# Patient Record
Sex: FEMALE | Race: BLACK OR AFRICAN AMERICAN | NOT HISPANIC OR LATINO | Employment: UNEMPLOYED | URBAN - METROPOLITAN AREA
[De-identification: names, ages, dates, MRNs, and addresses within clinical notes are randomized per-mention and may not be internally consistent; named-entity substitution may affect disease eponyms.]

---

## 2017-08-31 ENCOUNTER — NON-PROVIDER VISIT (OUTPATIENT)
Dept: OBGYN | Facility: CLINIC | Age: 32
End: 2017-08-31

## 2017-08-31 DIAGNOSIS — Z32.01 POSITIVE URINE PREGNANCY TEST: ICD-10-CM

## 2017-08-31 LAB
INT CON NEG: NEGATIVE
INT CON POS: POSITIVE
POC URINE PREGNANCY TEST: POSITIVE

## 2017-08-31 PROCEDURE — 81025 URINE PREGNANCY TEST: CPT | Performed by: OBSTETRICS & GYNECOLOGY

## 2017-10-11 ENCOUNTER — INITIAL PRENATAL (OUTPATIENT)
Dept: OBGYN | Facility: CLINIC | Age: 32
End: 2017-10-11
Payer: MEDICAID

## 2017-10-11 VITALS
HEIGHT: 61 IN | WEIGHT: 159.3 LBS | DIASTOLIC BLOOD PRESSURE: 60 MMHG | SYSTOLIC BLOOD PRESSURE: 112 MMHG | BODY MASS INDEX: 30.08 KG/M2

## 2017-10-11 DIAGNOSIS — N93.8 DUB (DYSFUNCTIONAL UTERINE BLEEDING): ICD-10-CM

## 2017-10-11 PROCEDURE — 99203 OFFICE O/P NEW LOW 30 MIN: CPT | Mod: 25 | Performed by: OBSTETRICS & GYNECOLOGY

## 2017-10-11 PROCEDURE — 76830 TRANSVAGINAL US NON-OB: CPT | Performed by: OBSTETRICS & GYNECOLOGY
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
## 2017-11-14 ENCOUNTER — HOSPITAL ENCOUNTER (OUTPATIENT)
Dept: LAB | Facility: MEDICAL CENTER | Age: 32
End: 2017-11-14
Attending: NURSE PRACTITIONER
Payer: MEDICAID

## 2017-11-14 ENCOUNTER — HOSPITAL ENCOUNTER (OUTPATIENT)
Facility: MEDICAL CENTER | Age: 32
End: 2017-11-14
Attending: NURSE PRACTITIONER
Payer: MEDICAID

## 2017-11-14 ENCOUNTER — INITIAL PRENATAL (OUTPATIENT)
Dept: OBGYN | Facility: CLINIC | Age: 32
End: 2017-11-14
Payer: MEDICAID

## 2017-11-14 VITALS — DIASTOLIC BLOOD PRESSURE: 60 MMHG | WEIGHT: 163 LBS | SYSTOLIC BLOOD PRESSURE: 90 MMHG | BODY MASS INDEX: 30.8 KG/M2

## 2017-11-14 DIAGNOSIS — F15.91 HISTORY OF METHAMPHETAMINE USE: ICD-10-CM

## 2017-11-14 DIAGNOSIS — Z34.82 PRENATAL CARE, SUBSEQUENT PREGNANCY IN SECOND TRIMESTER: ICD-10-CM

## 2017-11-14 DIAGNOSIS — Z34.82 PRENATAL CARE, SUBSEQUENT PREGNANCY IN SECOND TRIMESTER: Primary | ICD-10-CM

## 2017-11-14 DIAGNOSIS — Z98.891 HISTORY OF 2 CESAREAN SECTIONS: ICD-10-CM

## 2017-11-14 LAB
ABO GROUP BLD: NORMAL
APPEARANCE UR: CLEAR
APPEARANCE UR: NORMAL
BASOPHILS # BLD AUTO: 0.5 % (ref 0–1.8)
BASOPHILS # BLD: 0.04 K/UL (ref 0–0.12)
BILIRUB UR QL STRIP.AUTO: NEGATIVE
BILIRUB UR STRIP-MCNC: NORMAL MG/DL
BLD GP AB SCN SERPL QL: NORMAL
COLOR UR AUTO: NORMAL
COLOR UR: YELLOW
CULTURE IF INDICATED INDCX: NO UA CULTURE
EOSINOPHIL # BLD AUTO: 0.07 K/UL (ref 0–0.51)
EOSINOPHIL NFR BLD: 0.9 % (ref 0–6.9)
ERYTHROCYTE [DISTWIDTH] IN BLOOD BY AUTOMATED COUNT: 46.5 FL (ref 35.9–50)
GLUCOSE UR STRIP.AUTO-MCNC: NEGATIVE MG/DL
GLUCOSE UR STRIP.AUTO-MCNC: NORMAL MG/DL
HBV SURFACE AG SER QL: NEGATIVE
HCT VFR BLD AUTO: 38.5 % (ref 37–47)
HGB BLD-MCNC: 12.7 G/DL (ref 12–16)
HIV 1+2 AB+HIV1 P24 AG SERPL QL IA: NON REACTIVE
IMM GRANULOCYTES # BLD AUTO: 0.06 K/UL (ref 0–0.11)
IMM GRANULOCYTES NFR BLD AUTO: 0.7 % (ref 0–0.9)
KETONES UR STRIP.AUTO-MCNC: NEGATIVE MG/DL
KETONES UR STRIP.AUTO-MCNC: NORMAL MG/DL
LEUKOCYTE ESTERASE UR QL STRIP.AUTO: NEGATIVE
LEUKOCYTE ESTERASE UR QL STRIP.AUTO: NORMAL
LYMPHOCYTES # BLD AUTO: 2.56 K/UL (ref 1–4.8)
LYMPHOCYTES NFR BLD: 31.7 % (ref 22–41)
MCH RBC QN AUTO: 31 PG (ref 27–33)
MCHC RBC AUTO-ENTMCNC: 33 G/DL (ref 33.6–35)
MCV RBC AUTO: 93.9 FL (ref 81.4–97.8)
MICRO URNS: ABNORMAL
MONOCYTES # BLD AUTO: 0.38 K/UL (ref 0–0.85)
MONOCYTES NFR BLD AUTO: 4.7 % (ref 0–13.4)
NEUTROPHILS # BLD AUTO: 4.97 K/UL (ref 2–7.15)
NEUTROPHILS NFR BLD: 61.5 % (ref 44–72)
NITRITE UR QL STRIP.AUTO: NEGATIVE
NITRITE UR QL STRIP.AUTO: NORMAL
NRBC # BLD AUTO: 0 K/UL
NRBC BLD AUTO-RTO: 0 /100 WBC
PH UR STRIP.AUTO: 5 [PH] (ref 5–8)
PH UR STRIP.AUTO: 6.5 [PH]
PLATELET # BLD AUTO: 209 K/UL (ref 164–446)
PMV BLD AUTO: 11.8 FL (ref 9–12.9)
PROT UR QL STRIP: NEGATIVE MG/DL
PROT UR QL STRIP: NORMAL MG/DL
RBC # BLD AUTO: 4.1 M/UL (ref 4.2–5.4)
RBC UR QL AUTO: NEGATIVE
RBC UR QL AUTO: NORMAL
RH BLD: NORMAL
RUBV AB SER QL: 46.5 IU/ML
SP GR UR STRIP.AUTO: 1.01
SP GR UR STRIP.AUTO: 1.02
TREPONEMA PALLIDUM IGG+IGM AB [PRESENCE] IN SERUM OR PLASMA BY IMMUNOASSAY: NON REACTIVE
UROBILINOGEN UR STRIP-MCNC: NORMAL MG/DL
UROBILINOGEN UR STRIP.AUTO-MCNC: 0.2 MG/DL
WBC # BLD AUTO: 8.1 K/UL (ref 4.8–10.8)

## 2017-11-14 PROCEDURE — 86901 BLOOD TYPING SEROLOGIC RH(D): CPT

## 2017-11-14 PROCEDURE — 86900 BLOOD TYPING SEROLOGIC ABO: CPT

## 2017-11-14 PROCEDURE — 87591 N.GONORRHOEAE DNA AMP PROB: CPT

## 2017-11-14 PROCEDURE — 86780 TREPONEMA PALLIDUM: CPT

## 2017-11-14 PROCEDURE — 59401 PR NEW OB VISIT: CPT | Performed by: NURSE PRACTITIONER

## 2017-11-14 PROCEDURE — 36415 COLL VENOUS BLD VENIPUNCTURE: CPT

## 2017-11-14 PROCEDURE — 85025 COMPLETE CBC W/AUTO DIFF WBC: CPT

## 2017-11-14 PROCEDURE — 86762 RUBELLA ANTIBODY: CPT

## 2017-11-14 PROCEDURE — 87389 HIV-1 AG W/HIV-1&-2 AB AG IA: CPT

## 2017-11-14 PROCEDURE — 87340 HEPATITIS B SURFACE AG IA: CPT

## 2017-11-14 PROCEDURE — 81511 FTL CGEN ABNOR FOUR ANAL: CPT

## 2017-11-14 PROCEDURE — 81002 URINALYSIS NONAUTO W/O SCOPE: CPT | Performed by: NURSE PRACTITIONER

## 2017-11-14 PROCEDURE — 86850 RBC ANTIBODY SCREEN: CPT

## 2017-11-14 PROCEDURE — 81003 URINALYSIS AUTO W/O SCOPE: CPT

## 2017-11-14 PROCEDURE — 88175 CYTOPATH C/V AUTO FLUID REDO: CPT

## 2017-11-14 PROCEDURE — 87491 CHLMYD TRACH DNA AMP PROBE: CPT

## 2017-11-14 ASSESSMENT — ENCOUNTER SYMPTOMS
GASTROINTESTINAL NEGATIVE: 1
TREMORS: 0
CARDIOVASCULAR NEGATIVE: 1
SENSORY CHANGE: 0
DIZZINESS: 1
PSYCHIATRIC NEGATIVE: 1
FOCAL WEAKNESS: 0
SPEECH CHANGE: 0
MUSCULOSKELETAL NEGATIVE: 1
CONSTITUTIONAL NEGATIVE: 1
SEIZURES: 0
RESPIRATORY NEGATIVE: 1
EYES NEGATIVE: 1
LOSS OF CONSCIOUSNESS: 0
TINGLING: 0

## 2017-11-14 ASSESSMENT — PATIENT HEALTH QUESTIONNAIRE - PHQ9: CLINICAL INTERPRETATION OF PHQ2 SCORE: 0

## 2017-11-14 NOTE — LETTER
Cystic Fibrosis Carrier Testing  Adrian Messina    The following information is about a blood test that can be done to determine if you and/or your partner carry the gene for cystic fibrosis.    WHAT IS CYSTIC FIBROSIS?  · Cystic fibrosis (CF) is an inherited disease that affects more than 25,000 American children and young adults.  · Symptoms of CF vary but include lung congestion, pneumonia, diarrhea and poor growth.  Most people with CF have severe medical problems and some die at a young age.  Others have so few symptoms they are unaware they have CF.  · CF does not affect intelligence.  · Although there is no cure for CF at this time, scientists are making progress in improving treatment and in searching for a cure.  In the past many people with CF  at a very young age.  Today, many are living into their 20’s and 30’s.    IS THERE A CHANCE MY BABY COULD HAVE CYSTIC FIBROSIS?  · You can have a child with CF even if there is no history in your family (see chart below).  · CF testing can help determine if you are a carrier and at risk to have a child with CF.  Note: if both parents are carriers, there is a 1 in 4 (25%) chance with each pregnancy that they will have a child with CF.  · Carriers have one normal CF gene and one altered CF gene.  · People with CF have two altered CF genes.  · Most people have two normal copies of the CF gene.    Approximate risk that a couple with no family history of cystic fibrosis will have a child with cystic fibrosis:    Ethnic background / Risk     couple:  1 in 2,500   couple:  1 in 15,000            couple:  1 in 8,000     American couple:  1 in 32,000     WHAT TESTING IS AVAILABLE?  · There is a blood test that can be done to find out if you or your partner is a carrier.  · It is important to understand that CF carrier testing does not detect all CF carriers.  · If the test shows that you are both CF carriers, you unborn baby  can be tested to find out if the baby has CF.    HOW MUCH DOES IT COST TO HAVE CYSTIC FIBROSIS CARRIER TESTING?  · Cost and insurance coverage for CF carrier testing vary depending upon the laboratory used and your insurance policy.  · The average cost for CF carrier testing is $780 per person.  · Your genetic counselor can provide you with more information about cystic fibrosis carrier testing.    _____  Yes, I am interested in discussing carrier testing with a genetic counselor.    _____  No, I am not interested in CF carrier testing or in receiving more information about CF carrier testing.      Client signature: ________________________________________  11/14/2017

## 2017-11-14 NOTE — PROGRESS NOTES
NOB visit   Pt c/o cramping, denies any other complications   Pt declines Flu vaccine.   Pt would like AFP testing.   # [phone]

## 2017-11-14 NOTE — PROGRESS NOTES
S:  Adrian Messina is a 32 y.o.  who presents for her new OB exam.  She is 18w2d with and ROBERT of Estimated Date of Delivery: 4/15/18 based off of LMP . She has no complaints.  She is currently working at [employer]. Discussed heavy lifting and chemical exposure. No ER visits or previous care in this pregnancy.     Desires AFP.  Declines CF.  Denies VB, LOF, or cramping.  Denies dysuria, vaginal DC. Reports good fetal movement.     Pt is single and lives with roommated.  Pregnancy is planned and desired.      Discussed diet and exercise during pregnancy. Encouraged good nutrition, and daily exercise including walking or swimming. Discussed expected weight gain during pregnancy. Discussed adequate hydration during pregnancy.    Past Medical History:   Diagnosis Date   • Depression    • Substance abuse     meth    •  (vaginal birth after )      Family History   Problem Relation Age of Onset   • Cancer Mother      Social History     Social History   • Marital status: Single     Spouse name: N/A   • Number of children: N/A   • Years of education: N/A     Occupational History   • Not on file.     Social History Main Topics   • Smoking status: Former Smoker     Types: Cigarettes   • Smokeless tobacco: Current User      Comment: Pt states smoking about 3 day   • Alcohol use No   • Drug use: No      Comment: Pt states had substance abuse in the past for meth   • Sexual activity: Yes     Partners: Male     Birth control/ protection: Condom     Other Topics Concern   • Not on file     Social History Narrative   • No narrative on file     OB History    Para Term  AB Living   11 3 3   7 3   SAB TAB Ectopic Molar Multiple Live Births   1   1     3      # Outcome Date GA Lbr Binh/2nd Weight Sex Delivery Anes PTL Lv   11 Current            10 AB 2016              Birth Comments: Pt states no complicatios   9 SAB 2015 14w0d             Birth Comments: Pt states had a D/C   8 Term  11 39w0d   M CS-LTranv Spinal N SHYLA      Birth Comments: Repeat C/S   7 AB               Birth Comments: Pt states no complications.    6 AB               Birth Comments: Pt states no complications.    5 Term 06 39w0d  2.863 kg (6 lb 5 oz) M Vag-Spont EPI N SHYLA      Birth Comments: Pt states had    4 AB               Birth Comments: Pt states no complications.    3 Term 04 39w0d  2.438 kg (5 lb 6 oz) F CS-LTranv Spinal N SHYLA      Birth Comments: Primary C/S pt states baby had tachycardia   2 Ectopic               Birth Comments: Pt states had an ectopic pregnancy    1 AB               Birth Comments: Pt states no complications.           History of Varicella Virus: yes  History of HSV I or II in self or partner: no  History of Thyroid problems: no    O:  Blood pressure (!) 90/60, weight 73.9 kg (163 lb), last menstrual period 2017.   See Prenatal Physical.    Wet mount: deferred, no s/sx      A:   1.  IUP @ 18w2d per LMP        2.  S=D        3.  See problem list below        4.  History of  section x 2       Patient Active Problem List    Diagnosis Date Noted   • Prenatal care, subsequent pregnancy in second trimester 2017   • History of 2  sections 2017         P:  1.  GC/CT & pap done        2.  Prenatal labs ordered - lab slip given        3.  Discussed PNV, diet, avoidances and adequate water intake        4.  NOB packet given        5.  Return to office in 4 wks        6.  Complete OB US in 1-2 wks        7.  Unsure of BTL    No orders of the defined types were placed in this encounter.      HPI    Review of Systems   Constitutional: Negative.    HENT: Negative.    Eyes: Negative.    Respiratory: Negative.    Cardiovascular: Negative.    Gastrointestinal: Negative.    Genitourinary: Negative.    Musculoskeletal: Negative.    Skin: Negative.    Neurological: Positive for dizziness. Negative for tingling, tremors, sensory change, speech  change, focal weakness, seizures and loss of consciousness.   Endo/Heme/Allergies: Negative.    Psychiatric/Behavioral: Negative.    All other systems reviewed and are negative.         Objective:     BP (!) 90/60   Wt 73.9 kg (163 lb)   LMP 2017   BMI 30.80 kg/m²      Physical Exam   Constitutional: She is oriented to person, place, and time. She appears well-developed and well-nourished.   HENT:   Head: Atraumatic.   Nose: Nose normal.   Eyes: Conjunctivae and EOM are normal.   Neck: Normal range of motion. Neck supple.   Cardiovascular: Normal rate, regular rhythm, normal heart sounds and intact distal pulses.    Pulmonary/Chest: Effort normal and breath sounds normal.   Abdominal: Soft. Bowel sounds are normal.   Genitourinary: Uterus is enlarged. Cervix exhibits discharge. Vaginal discharge found.   Musculoskeletal: Normal range of motion.   Neurological: She is alert and oriented to person, place, and time. She has normal reflexes.   Skin: Skin is warm and dry. Capillary refill takes less than 2 seconds.   Psychiatric: She has a normal mood and affect. Her behavior is normal. Judgment and thought content normal.   Nursing note and vitals reviewed.       Assessment/Plan:     1. Prenatal care, subsequent pregnancy in second trimester  ROBERT 4/15/18 per LMP  - PREG CNTR PRENATAL PN; Future  - THINPREP RFLX HPV ASCUS W/CTNG; Future  - POCT Urinalysis  - AFP TETRA; Future  - US-OB 2ND 3RD TRI COMPLETE; Future    2. History of 2  sections  Desires repeat, unsure of BTL

## 2017-11-16 LAB
C TRACH DNA GENITAL QL NAA+PROBE: NEGATIVE
CYTOLOGY REG CYTOL: NORMAL
N GONORRHOEA DNA GENITAL QL NAA+PROBE: NEGATIVE
SPECIMEN SOURCE: NORMAL

## 2017-11-17 LAB
# FETUSES US: NORMAL
AFP MOM SERPL: 1.15
AFP SERPL-MCNC: 53 NG/ML
AGE - REPORTED: 32.7 YR
GA METHOD: NORMAL
GA: 18.29 WEEKS
HCG MOM SERPL: 0.75
HCG SERPL-ACNC: NORMAL IU/L
IDDM PATIENT QL: NORMAL
INHIBIN A MOM SERPL: 1.72
INHIBIN A SERPL-MCNC: 242 PG/ML
INTEGRATED SCN PATIENT-IMP: NORMAL
PATHOLOGY STUDY: NORMAL
U ESTRIOL MOM SERPL: 1.54
U ESTRIOL SERPL-MCNC: 2.36 NG/ML

## 2017-11-28 ENCOUNTER — APPOINTMENT (OUTPATIENT)
Dept: RADIOLOGY | Facility: IMAGING CENTER | Age: 32
End: 2017-11-28
Attending: NURSE PRACTITIONER
Payer: MEDICAID

## 2017-11-28 ENCOUNTER — DATING (OUTPATIENT)
Dept: OBGYN | Facility: CLINIC | Age: 32
End: 2017-11-28

## 2017-11-28 DIAGNOSIS — O44.20 MARGINAL PLACENTA PREVIA: ICD-10-CM

## 2017-11-28 DIAGNOSIS — Z34.82 PRENATAL CARE, SUBSEQUENT PREGNANCY IN SECOND TRIMESTER: ICD-10-CM

## 2017-11-28 PROCEDURE — 76805 OB US >/= 14 WKS SNGL FETUS: CPT | Performed by: OBSTETRICS & GYNECOLOGY

## 2017-11-29 ENCOUNTER — TELEPHONE (OUTPATIENT)
Dept: OBGYN | Facility: CLINIC | Age: 32
End: 2017-11-29

## 2017-11-29 NOTE — TELEPHONE ENCOUNTER
----- Message from Tamiko Dawn M.D. sent at 11/28/2017  3:31 PM PST -----  Please inform patient of marginal placenta previa pelvic rest and follow-up ultrasonography in 8-10 weeks, previa precautions  11/29/17@ 10:02 am. N/a, msg left for patient to call back.  Patient called back, was given previa precautions. Call transferred to [place] to schedule f/u US.

## 2017-12-12 ENCOUNTER — ROUTINE PRENATAL (OUTPATIENT)
Dept: OBGYN | Facility: CLINIC | Age: 32
End: 2017-12-12
Payer: MEDICAID

## 2017-12-12 VITALS — WEIGHT: 164 LBS | BODY MASS INDEX: 30.99 KG/M2 | DIASTOLIC BLOOD PRESSURE: 60 MMHG | SYSTOLIC BLOOD PRESSURE: 100 MMHG

## 2017-12-12 DIAGNOSIS — Z98.891 HISTORY OF 2 CESAREAN SECTIONS: ICD-10-CM

## 2017-12-12 PROCEDURE — 90686 IIV4 VACC NO PRSV 0.5 ML IM: CPT | Performed by: NURSE PRACTITIONER

## 2017-12-12 PROCEDURE — 90040 PR PRENATAL FOLLOW UP: CPT | Performed by: NURSE PRACTITIONER

## 2017-12-12 PROCEDURE — 90471 IMMUNIZATION ADMIN: CPT | Performed by: NURSE PRACTITIONER

## 2017-12-12 ASSESSMENT — PATIENT HEALTH QUESTIONNAIRE - PHQ9: CLINICAL INTERPRETATION OF PHQ2 SCORE: 0

## 2017-12-12 NOTE — PROGRESS NOTES
Ob f/u. + fetal movement  Baby is moving ok  No VB, LOF or contractions   C/O cramping very often. Three times a day every day   Phone number # [phone]  Pharmacy verified with patient  WT= 164 lbs             BA=719/60  Flu vaccine given. 12/12/2017 LEFT  Deltoid. 9:20 am VIS given and screening check list reviewed with pt.

## 2017-12-12 NOTE — PROGRESS NOTES
SUBJECTIVE:  Pt is a 32 y.o.   at 22w2d  gestation. Presents today for follow-up prenatal care. Reports no issues at this time.  Reports  fetal movement. Denies cramping/contractions, bleeding or leaking of fluid. Denies dysuria, headaches, N/V, or other issues at this time. Generally feels well today.     OBJECTIVE:  - See prenatal vitals flow  Vitals:    17 0915   BP: 100/60   Weight: 74.4 kg (164 lb)      - Pertinent Labs: PNP and AFP WNL  - Pertinent ultrasound: US shows marginal placenta previa           ASSESSMENT:   - IUP at 22w2d by 13 week US   - S=D   -   Patient Active Problem List    Diagnosis Date Noted   • Marginal placenta previa 2017   • Prenatal care, subsequent pregnancy in second trimester 2017   • History of 2  sections 2017   • History of methamphetamine use 2017         PLAN:  - c/s consent today   - Previa precautions reviewed   - S/sx pregnancy and labor warning signs vs general discomforts discussed  - Fetal movements and kick counts reviewed   - Adequate hydration reinforced  - Nutrition/exercise/vitamin education: continued PNV  - S/p Flu vacc today   - Anticipatory guidance given  - RTC in 4 weeks for follow-up prenatal care

## 2018-01-09 ENCOUNTER — HOSPITAL ENCOUNTER (OUTPATIENT)
Dept: LAB | Facility: MEDICAL CENTER | Age: 33
End: 2018-01-09
Attending: NURSE PRACTITIONER
Payer: MEDICAID

## 2018-01-09 ENCOUNTER — ROUTINE PRENATAL (OUTPATIENT)
Dept: OBGYN | Facility: CLINIC | Age: 33
End: 2018-01-09
Payer: MEDICAID

## 2018-01-09 VITALS — SYSTOLIC BLOOD PRESSURE: 110 MMHG | DIASTOLIC BLOOD PRESSURE: 58 MMHG

## 2018-01-09 DIAGNOSIS — Z34.82 PRENATAL CARE, SUBSEQUENT PREGNANCY IN SECOND TRIMESTER: ICD-10-CM

## 2018-01-09 DIAGNOSIS — Z34.82 PRENATAL CARE, SUBSEQUENT PREGNANCY IN SECOND TRIMESTER: Primary | ICD-10-CM

## 2018-01-09 LAB
HCT VFR BLD AUTO: 34.7 % (ref 37–47)
HGB BLD-MCNC: 11.3 G/DL (ref 12–16)
TREPONEMA PALLIDUM IGG+IGM AB [PRESENCE] IN SERUM OR PLASMA BY IMMUNOASSAY: NON REACTIVE

## 2018-01-09 PROCEDURE — 82950 GLUCOSE TEST: CPT

## 2018-01-09 PROCEDURE — 85014 HEMATOCRIT: CPT

## 2018-01-09 PROCEDURE — 36415 COLL VENOUS BLD VENIPUNCTURE: CPT

## 2018-01-09 PROCEDURE — 90040 PR PRENATAL FOLLOW UP: CPT | Performed by: NURSE PRACTITIONER

## 2018-01-09 PROCEDURE — 86780 TREPONEMA PALLIDUM: CPT

## 2018-01-09 PROCEDURE — 85018 HEMOGLOBIN: CPT

## 2018-01-09 NOTE — PROGRESS NOTES
Pt here today for OB follow up  Reports +FM  WT: 170 lb  BP: 110/58  Pt states having cramping. States no other complaints.  1 hr gtt, H/H, and T. Pallidum lab slip given today with instructions.   [place] # [phone] or [phone]

## 2018-01-10 LAB — GLUCOSE 1H P 50 G GLC PO SERPL-MCNC: 87 MG/DL (ref 70–139)

## 2018-01-23 ENCOUNTER — ROUTINE PRENATAL (OUTPATIENT)
Dept: OBGYN | Facility: CLINIC | Age: 33
End: 2018-01-23
Payer: MEDICAID

## 2018-01-23 VITALS — SYSTOLIC BLOOD PRESSURE: 110 MMHG | WEIGHT: 172 LBS | BODY MASS INDEX: 32.5 KG/M2 | DIASTOLIC BLOOD PRESSURE: 60 MMHG

## 2018-01-23 DIAGNOSIS — Z34.82 PRENATAL CARE, SUBSEQUENT PREGNANCY IN SECOND TRIMESTER: Primary | ICD-10-CM

## 2018-01-23 PROCEDURE — 90471 IMMUNIZATION ADMIN: CPT | Performed by: NURSE PRACTITIONER

## 2018-01-23 PROCEDURE — 90715 TDAP VACCINE 7 YRS/> IM: CPT | Performed by: NURSE PRACTITIONER

## 2018-01-23 PROCEDURE — 90040 PR PRENATAL FOLLOW UP: CPT | Performed by: NURSE PRACTITIONER

## 2018-01-23 ASSESSMENT — PATIENT HEALTH QUESTIONNAIRE - PHQ9: CLINICAL INTERPRETATION OF PHQ2 SCORE: 0

## 2018-01-23 NOTE — PROGRESS NOTES
Ob f/u. + fetal movement good  No VB, LOF or contractions   C/O no complaints today   Phone number # [phone]  Pharmacy verified with patient  WT=  172 lbs            QM=021/60  RODDY given today with instructions   Tdap vaccine given. 01/23/2078 Left  Deltoid. 9:15 am. VIS given and screening check list reviewed with pt.  BTL signed today

## 2018-01-23 NOTE — PROGRESS NOTES
S) Pt is a 32 y.o.   at 28w2d  gestation. Routine prenatal care today. No complaints today. Tdap done today, BTL paperwork signed.  labor precautions discussed and all questions answered.    Fetal movement Normal  Cramping no  VB no  LOF no   Denies dysuria. Generally feels well today. Good self-care activities identified. Denies headaches, swelling, visual changes, or epigastric pain .     O) Blood pressure 110/60, weight 78 kg (172 lb), last menstrual period 2017.        Labs:       PNL: WNL       GCT: 87       AFP: normal       GBS: N/A       Pertinent ultrasound -        17- Survey WNL, marginal previa noted, BRITANY 15.18cm, c/w prev dating.   Has follow up scheduled 18 to recheck placenta    A) IUP at 28w2d       S=D         Patient Active Problem List    Diagnosis Date Noted   • Marginal placenta previa 2017   • Prenatal care, subsequent pregnancy in second trimester 2017   • History of 2  sections 2017   • History of methamphetamine use 2017          SVE: deferred         TDAP: yes       FLU: yes        BTL: yes       : n/a       C/S Consent: yes       IOL or C/S scheduled: no       LAST PAP: 17- negative         P) s/s ptl vs general discomforts. Fetal movements reviewed. General ed and anticipatory guidance. Nutrition/exercise/vitamin. Plans breast Plans pp contraception- BTL.  Continue PNV.

## 2018-01-30 ENCOUNTER — APPOINTMENT (OUTPATIENT)
Dept: RADIOLOGY | Facility: IMAGING CENTER | Age: 33
End: 2018-01-30
Attending: OBSTETRICS & GYNECOLOGY
Payer: MEDICAID

## 2018-01-30 ENCOUNTER — DATING (OUTPATIENT)
Dept: OBGYN | Facility: CLINIC | Age: 33
End: 2018-01-30

## 2018-01-30 DIAGNOSIS — O44.20 MARGINAL PLACENTA PREVIA: ICD-10-CM

## 2018-01-30 PROCEDURE — 76817 TRANSVAGINAL US OBSTETRIC: CPT | Mod: 26 | Performed by: OBSTETRICS & GYNECOLOGY

## 2018-02-06 ENCOUNTER — ROUTINE PRENATAL (OUTPATIENT)
Dept: OBGYN | Facility: CLINIC | Age: 33
End: 2018-02-06
Payer: MEDICAID

## 2018-02-06 VITALS — SYSTOLIC BLOOD PRESSURE: 102 MMHG | DIASTOLIC BLOOD PRESSURE: 62 MMHG | BODY MASS INDEX: 32.69 KG/M2 | WEIGHT: 173 LBS

## 2018-02-06 DIAGNOSIS — Z34.82 PRENATAL CARE, SUBSEQUENT PREGNANCY IN SECOND TRIMESTER: Primary | ICD-10-CM

## 2018-02-06 PROCEDURE — 90040 PR PRENATAL FOLLOW UP: CPT | Performed by: NURSE PRACTITIONER

## 2018-02-06 NOTE — PROGRESS NOTES
Ob f/u. + fetal movement good  No VB, LOF or contractions   C/O  No complaints today   Phone number # [phone]  Pharmacy verified with patient  VZ=255 lbs              EG=739/62

## 2018-02-06 NOTE — PROGRESS NOTES
S) Pt is a 32 y.o.   at 30w2d  gestation. Routine prenatal care today. No complaints today. Had US to follow up previa, now resolved.  labor precautions discussed and all questions answered.    Fetal movement Normal  Cramping no  VB no  LOF no   Denies dysuria. Generally feels well today. Good self-care activities identified. Denies headaches, swelling, visual changes, or epigastric pain .     O) Blood pressure 102/62, weight 78.5 kg (173 lb), last menstrual period 2017.        Labs:       PNL: WNL       GCT: 87       AFP: normal       GBS: N/A       Pertinent ultrasound -        17- Survey WNL, BRITANY 15.18cm, c/w prev dating. Marginal previa noted.  18- Recheck placenta- survey WNL, BRITANY 17.6cm, c/w prev dating. Placenta no longer previa or low-lying. No follow up indicated    A) IUP at 30w2d       S=D         Patient Active Problem List    Diagnosis Date Noted   • History of 2  sections 2017     Priority: High   • History of methamphetamine use 2017     Priority: High   • Prenatal care, subsequent pregnancy in second trimester 2017     Priority: Medium          SVE: deferred         TDAP: yes       FLU: yes        BTL: yes       : no       C/S Consent: yes       IOL or C/S scheduled: no       LAST PAP: 17- Negative         P) s/s ptl vs general discomforts. Fetal movements reviewed. General ed and anticipatory guidance. Nutrition/exercise/vitamin. Plans breast Plans pp contraception- BTL.  Continue PNV.

## 2018-02-20 ENCOUNTER — ROUTINE PRENATAL (OUTPATIENT)
Dept: OBGYN | Facility: CLINIC | Age: 33
End: 2018-02-20
Payer: MEDICAID

## 2018-02-20 VITALS — WEIGHT: 172 LBS | BODY MASS INDEX: 32.5 KG/M2 | DIASTOLIC BLOOD PRESSURE: 62 MMHG | SYSTOLIC BLOOD PRESSURE: 110 MMHG

## 2018-02-20 DIAGNOSIS — Z34.82 PRENATAL CARE, SUBSEQUENT PREGNANCY IN SECOND TRIMESTER: Primary | ICD-10-CM

## 2018-02-20 PROCEDURE — 90040 PR PRENATAL FOLLOW UP: CPT | Performed by: NURSE PRACTITIONER

## 2018-02-20 NOTE — PROGRESS NOTES
S) Pt is a 32 y.o.   at 32w2d  gestation. Routine prenatal care today. No complaints today.  labor precautions discussed, all questions answered. Will place c/s referral next visit.    Fetal movement Normal  Cramping no  VB no  LOF no   Denies dysuria. Generally feels well today. Good self-care activities identified. Denies headaches, swelling, visual changes, or epigastric pain .     O) Blood pressure 110/62, weight 78 kg (172 lb), last menstrual period 2017.        Labs:       PNL: WNL       GCT: 87       AFP: normal       GBS: N/A       Pertinent ultrasound -        17- Survey WNL, BRITANY 15.18cm, c/w prev dating. Marginal previa noted, pelvic rest ordered, follow up in 8-10 weeks for recheck.  1/10/18- TVUS for previa- now anterior, previa resolved, BRITANY 17.6cm, c/w prev dating.    A) IUP at 32w2d       S=D         Patient Active Problem List    Diagnosis Date Noted   • History of 2  sections 2017     Priority: High   • History of methamphetamine use 2017     Priority: High   • Prenatal care, subsequent pregnancy in second trimester 2017     Priority: Medium          SVE: deferred         TDAP: yes       FLU: yes        BTL: yes       : no       C/S Consent: yes       IOL or C/S scheduled: no       LAST PAP: 17- Negative         P) s/s ptl vs general discomforts. Fetal movements reviewed. General ed and anticipatory guidance. Nutrition/exercise/vitamin. Plans breast Plans pp contraception- BTL.  Continue PNV.

## 2018-02-20 NOTE — LETTER
February 20, 2018    To Whom It May Concern:         This is confirmation that Willi Alivia accompanied/drove Alyse Fernandez to her Dr Appt today. She was seen by Tresa Figueroa C.N.M. on 2/20/18.          Please excuse him from work today, 2/20/18.         If you have any questions please do not hesitate to call me at the phone number listed below.    Sincerely,          Tresa Figueroa C.N.M.  [phone]

## 2018-02-20 NOTE — PROGRESS NOTES
Ob f/u. + fetal movement good  No VB, LOF or contractions   C/O no complaints today   Phone number # [phone]  Pharmacy verified with patient  GU=542 lbs              ET=868/62

## 2018-03-07 ENCOUNTER — ROUTINE PRENATAL (OUTPATIENT)
Dept: OBGYN | Facility: CLINIC | Age: 33
End: 2018-03-07
Payer: MEDICAID

## 2018-03-07 ENCOUNTER — HOSPITAL ENCOUNTER (OUTPATIENT)
Facility: MEDICAL CENTER | Age: 33
End: 2018-03-07
Attending: OBSTETRICS & GYNECOLOGY | Admitting: OBSTETRICS & GYNECOLOGY
Payer: MEDICAID

## 2018-03-07 VITALS — SYSTOLIC BLOOD PRESSURE: 114 MMHG | WEIGHT: 175.5 LBS | BODY MASS INDEX: 33.16 KG/M2 | DIASTOLIC BLOOD PRESSURE: 58 MMHG

## 2018-03-07 VITALS
WEIGHT: 175 LBS | DIASTOLIC BLOOD PRESSURE: 69 MMHG | BODY MASS INDEX: 33.04 KG/M2 | HEIGHT: 61 IN | TEMPERATURE: 98.9 F | HEART RATE: 96 BPM | SYSTOLIC BLOOD PRESSURE: 117 MMHG

## 2018-03-07 DIAGNOSIS — Z98.891 HISTORY OF CESAREAN DELIVERY: ICD-10-CM

## 2018-03-07 LAB
APPEARANCE UR: ABNORMAL
COLOR UR AUTO: YELLOW
GLUCOSE UR QL STRIP.AUTO: NEGATIVE MG/DL
KETONES UR QL STRIP.AUTO: NEGATIVE MG/DL
LEUKOCYTE ESTERASE UR QL STRIP.AUTO: NEGATIVE
NITRITE UR QL STRIP.AUTO: NEGATIVE
PH UR STRIP.AUTO: 7 [PH]
PROT UR QL STRIP: NEGATIVE MG/DL
RBC UR QL AUTO: NEGATIVE
SP GR UR: 1.02

## 2018-03-07 PROCEDURE — 700102 HCHG RX REV CODE 250 W/ 637 OVERRIDE(OP): Performed by: PHYSICIAN ASSISTANT

## 2018-03-07 PROCEDURE — A9270 NON-COVERED ITEM OR SERVICE: HCPCS | Performed by: PHYSICIAN ASSISTANT

## 2018-03-07 PROCEDURE — 81002 URINALYSIS NONAUTO W/O SCOPE: CPT

## 2018-03-07 PROCEDURE — 96360 HYDRATION IV INFUSION INIT: CPT

## 2018-03-07 PROCEDURE — 59025 FETAL NON-STRESS TEST: CPT | Performed by: OBSTETRICS & GYNECOLOGY

## 2018-03-07 PROCEDURE — 700105 HCHG RX REV CODE 258

## 2018-03-07 PROCEDURE — 90040 PR PRENATAL FOLLOW UP: CPT | Performed by: NURSE PRACTITIONER

## 2018-03-07 RX ORDER — DIPHENHYDRAMINE HCL 25 MG
25 TABLET ORAL ONCE
Status: COMPLETED | OUTPATIENT
Start: 2018-03-07 | End: 2018-03-07

## 2018-03-07 RX ORDER — SODIUM CHLORIDE, SODIUM LACTATE, POTASSIUM CHLORIDE, CALCIUM CHLORIDE 600; 310; 30; 20 MG/100ML; MG/100ML; MG/100ML; MG/100ML
INJECTION, SOLUTION INTRAVENOUS
Status: COMPLETED
Start: 2018-03-07 | End: 2018-03-07

## 2018-03-07 RX ADMIN — DIPHENHYDRAMINE HCL 25 MG: 25 TABLET ORAL at 20:45

## 2018-03-07 RX ADMIN — SODIUM CHLORIDE, POTASSIUM CHLORIDE, SODIUM LACTATE AND CALCIUM CHLORIDE 1000 ML: 600; 310; 30; 20 INJECTION, SOLUTION INTRAVENOUS at 19:24

## 2018-03-07 NOTE — PROGRESS NOTES
SUBJECTIVE:  Pt is a 32 y.o.   at 34w3d  gestation. Presents today for follow-up prenatal care. Reports no issues at this time.  Reports good  fetal movement. Denies cramping/contractions, bleeding or leaking of fluid. Denies dysuria, headaches, N/V, or other issues at this time. Generally feels well today.     OBJECTIVE:  - See prenatal vitals flow  -   Vitals:    18 0948   BP: 114/58   Weight: 79.6 kg (175 lb 8 oz)      Labs - normal pnp, normal glucose, normal AFP  US - previa resolved.            ASSESSMENT:   - IUP at 34w3d    - S=D   -   Patient Active Problem List    Diagnosis Date Noted   • History of 2  sections 2017     Priority: High   • History of methamphetamine use 2017     Priority: High   • Prenatal care, subsequent pregnancy in second trimester 2017     Priority: Medium         PLAN:  - S/sx pregnancy and labor warning signs vs general discomforts discussed  - Fetal movements and kick counts reviewed   - Adequate hydration reinforced  - Nutrition/exercise/vitamin education; continued PNV  - Encouraged tour of LnD/childbirth education classes: contact info provided   -Order placed for repeat c/s to be scheduled.

## 2018-03-08 NOTE — PROGRESS NOTES
1820:  with EDC of 4/15 making her 34.3 presents with UCs q 5 min since 1100 today. States she has been at work all day and hasn't been able to rest or drink enough water. Denies LOF or VB; reports good FM. SVE /floating. Water provided. MD in delivery  : No UCs seen or palpated but pt states she still feels them. Report given to JAVI DOLL; orders received  : IV started and LR bolus running  : Pt states she still has lower, dull backache; SVE with no change. Educated about using hot packs/shower, stretching, drinking more water, and support band to assist with back pain. Pt states she has not been sleeping well; orders received from JAVI DOLL.   2030: PTL precautions provided; educated about the importance of doing kick counts and staying hydrated.

## 2018-03-12 NOTE — PROGRESS NOTES
Pre op Thurs April 5 at 10:00am with Dr Bautista and c section Sun April 8 at 12:00pm with Dr Bautista and resident to assist.    Pt notified , please give instructions next visit. Thanks.

## 2018-03-21 ENCOUNTER — HOSPITAL ENCOUNTER (OUTPATIENT)
Facility: MEDICAL CENTER | Age: 33
End: 2018-03-21
Attending: NURSE PRACTITIONER
Payer: MEDICAID

## 2018-03-21 ENCOUNTER — ROUTINE PRENATAL (OUTPATIENT)
Dept: OBGYN | Facility: CLINIC | Age: 33
End: 2018-03-21
Payer: MEDICAID

## 2018-03-21 VITALS — SYSTOLIC BLOOD PRESSURE: 116 MMHG | WEIGHT: 176 LBS | DIASTOLIC BLOOD PRESSURE: 64 MMHG | BODY MASS INDEX: 33.25 KG/M2

## 2018-03-21 DIAGNOSIS — O09.899 SUPERVISION OF OTHER HIGH RISK PREGNANCY, ANTEPARTUM: ICD-10-CM

## 2018-03-21 PROCEDURE — 87653 STREP B DNA AMP PROBE: CPT

## 2018-03-21 PROCEDURE — 90040 PR PRENATAL FOLLOW UP: CPT | Performed by: NURSE PRACTITIONER

## 2018-03-21 NOTE — PROGRESS NOTES
SUBJECTIVE:  Pt is a 32 y.o.   at 36w3d  gestation. Presents today for follow-up prenatal care. Reports no issues at this time.  Reports good  fetal movement. Denies cramping/contractions, bleeding or leaking of fluid. Denies dysuria, headaches, N/V, or other issues at this time. Generally feels well today.     OBJECTIVE:  - See prenatal vitals flow  -   Vitals:    18 0948   BP: 116/64   Weight: 79.8 kg (176 lb)      Labs - normal pnp, normal AFP, normal glucose.   US - marginal previa resolved.            ASSESSMENT:   - IUP at 36w3d   - S=D   -   Patient Active Problem List    Diagnosis Date Noted   • History of 2  sections 2017     Priority: High   • History of methamphetamine use 2017     Priority: High   • Prenatal care, subsequent pregnancy in second trimester 2017     Priority: Medium         PLAN:  - S/sx pregnancy and labor warning signs vs general discomforts discussed  - Fetal movements and kick counts reviewed   - Adequate hydration reinforced  - Nutrition/exercise/vitamin education; continued PNV  -GBS done today. Start weekly visits. Preop appt instructions given.

## 2018-03-21 NOTE — PROGRESS NOTES
OB f/u. + fetal movement.  No VB, LOF   Good phone # [phone]  Preferred pharmacy confirmed.  Pre op Thurs April 5 at 10:00am with Dr Bautista and c section Sun April 8 at 12:00pm with Dr Bautista; pt given information and instructions  GBS collected today  Pt c/o irregular UC

## 2018-03-22 LAB — GP B STREP DNA SPEC QL NAA+PROBE: POSITIVE

## 2018-03-23 PROBLEM — B95.1 GROUP BETA STREP POSITIVE: Status: ACTIVE | Noted: 2018-03-23

## 2018-03-27 ENCOUNTER — ROUTINE PRENATAL (OUTPATIENT)
Dept: OBGYN | Facility: CLINIC | Age: 33
End: 2018-03-27
Payer: MEDICAID

## 2018-03-27 VITALS — SYSTOLIC BLOOD PRESSURE: 112 MMHG | BODY MASS INDEX: 33.25 KG/M2 | WEIGHT: 176 LBS | DIASTOLIC BLOOD PRESSURE: 52 MMHG

## 2018-03-27 DIAGNOSIS — Z98.891 HISTORY OF 2 CESAREAN SECTIONS: ICD-10-CM

## 2018-03-27 PROCEDURE — 90040 PR PRENATAL FOLLOW UP: CPT | Performed by: OBSTETRICS & GYNECOLOGY

## 2018-03-27 NOTE — PROGRESS NOTES
OB F/U  Denies VB, LOF, or UC  +FM  Phone#: [phone]  Pharmacy Confirmed.  C/O: None  GBS positive  Pre-op scheduled for 4/5/2018  C/S scheduled 4/8/2018

## 2018-03-27 NOTE — PROGRESS NOTES
32 y.o.  37w2d The patient is here for routine obstetrical followup. She reports good fetal movement. She denies contractions, vaginal bleeding, or leaking of fluid.    The patient's pregnancy is complicated by   Patient Active Problem List    Diagnosis Date Noted   • History of 2  sections 2017     Priority: High   • History of methamphetamine use 2017     Priority: High   • Prenatal care, subsequent pregnancy in second trimester 2017     Priority: Medium   • Group beta Strep positive 2018     GBS positive  Repeat C/S and BTL scheduled 18 @ 12:00    Followup in 1 week  Labor precautions were discussed with patient  Fetal kick counts were discussed with patient

## 2018-03-31 ENCOUNTER — HOSPITAL ENCOUNTER (INPATIENT)
Facility: MEDICAL CENTER | Age: 33
LOS: 3 days | End: 2018-04-03
Attending: OBSTETRICS & GYNECOLOGY | Admitting: OBSTETRICS & GYNECOLOGY
Payer: MEDICAID

## 2018-03-31 LAB
AMPHET UR QL SCN: NEGATIVE
BARBITURATES UR QL SCN: NEGATIVE
BASOPHILS # BLD AUTO: 0.3 % (ref 0–1.8)
BASOPHILS # BLD: 0.03 K/UL (ref 0–0.12)
BENZODIAZ UR QL SCN: NEGATIVE
BZE UR QL SCN: NEGATIVE
CANNABINOIDS UR QL SCN: NEGATIVE
EOSINOPHIL # BLD AUTO: 0.06 K/UL (ref 0–0.51)
EOSINOPHIL NFR BLD: 0.7 % (ref 0–6.9)
ERYTHROCYTE [DISTWIDTH] IN BLOOD BY AUTOMATED COUNT: 46.2 FL (ref 35.9–50)
HCT VFR BLD AUTO: 37.9 % (ref 37–47)
HGB BLD-MCNC: 12.8 G/DL (ref 12–16)
HOLDING TUBE BB 8507: NORMAL
IMM GRANULOCYTES # BLD AUTO: 0.07 K/UL (ref 0–0.11)
IMM GRANULOCYTES NFR BLD AUTO: 0.8 % (ref 0–0.9)
LYMPHOCYTES # BLD AUTO: 2.26 K/UL (ref 1–4.8)
LYMPHOCYTES NFR BLD: 24.8 % (ref 22–41)
MCH RBC QN AUTO: 31.1 PG (ref 27–33)
MCHC RBC AUTO-ENTMCNC: 33.8 G/DL (ref 33.6–35)
MCV RBC AUTO: 92.2 FL (ref 81.4–97.8)
METHADONE UR QL SCN: NEGATIVE
MONOCYTES # BLD AUTO: 0.46 K/UL (ref 0–0.85)
MONOCYTES NFR BLD AUTO: 5 % (ref 0–13.4)
NEUTROPHILS # BLD AUTO: 6.24 K/UL (ref 2–7.15)
NEUTROPHILS NFR BLD: 68.4 % (ref 44–72)
NRBC # BLD AUTO: 0 K/UL
NRBC BLD-RTO: 0 /100 WBC
OPIATES UR QL SCN: NEGATIVE
OXYCODONE UR QL SCN: NEGATIVE
PCP UR QL SCN: NEGATIVE
PLATELET # BLD AUTO: 121 K/UL (ref 164–446)
PMV BLD AUTO: 12.2 FL (ref 9–12.9)
PROPOXYPH UR QL SCN: NEGATIVE
RBC # BLD AUTO: 4.11 M/UL (ref 4.2–5.4)
WBC # BLD AUTO: 9.1 K/UL (ref 4.8–10.8)

## 2018-03-31 PROCEDURE — 770002 HCHG ROOM/CARE - OB PRIVATE (112)

## 2018-03-31 PROCEDURE — 304966 HCHG RECOVERY SVSC TIME ADDL 1/2 HR: Performed by: OBSTETRICS & GYNECOLOGY

## 2018-03-31 PROCEDURE — 304964 HCHG RECOVERY ROOM TIME 1HR: Performed by: OBSTETRICS & GYNECOLOGY

## 2018-03-31 PROCEDURE — 700111 HCHG RX REV CODE 636 W/ 250 OVERRIDE (IP)

## 2018-03-31 PROCEDURE — 81002 URINALYSIS NONAUTO W/O SCOPE: CPT

## 2018-03-31 PROCEDURE — 700102 HCHG RX REV CODE 250 W/ 637 OVERRIDE(OP): Performed by: ANESTHESIOLOGY

## 2018-03-31 PROCEDURE — 700102 HCHG RX REV CODE 250 W/ 637 OVERRIDE(OP): Performed by: OBSTETRICS & GYNECOLOGY

## 2018-03-31 PROCEDURE — 80307 DRUG TEST PRSMV CHEM ANLYZR: CPT

## 2018-03-31 PROCEDURE — 700101 HCHG RX REV CODE 250

## 2018-03-31 PROCEDURE — 700111 HCHG RX REV CODE 636 W/ 250 OVERRIDE (IP): Performed by: ANESTHESIOLOGY

## 2018-03-31 PROCEDURE — 36415 COLL VENOUS BLD VENIPUNCTURE: CPT

## 2018-03-31 PROCEDURE — 306828 HCHG ANES-TIME GENERAL: Performed by: OBSTETRICS & GYNECOLOGY

## 2018-03-31 PROCEDURE — 700105 HCHG RX REV CODE 258: Performed by: ANESTHESIOLOGY

## 2018-03-31 PROCEDURE — 305385 HCHG SURGICAL SERVICES 1/4 HOUR: Performed by: OBSTETRICS & GYNECOLOGY

## 2018-03-31 PROCEDURE — A9270 NON-COVERED ITEM OR SERVICE: HCPCS | Performed by: OBSTETRICS & GYNECOLOGY

## 2018-03-31 PROCEDURE — 0UL70CZ OCCLUSION OF BILATERAL FALLOPIAN TUBES WITH EXTRALUMINAL DEVICE, OPEN APPROACH: ICD-10-PCS | Performed by: OBSTETRICS & GYNECOLOGY

## 2018-03-31 PROCEDURE — 59514 CESAREAN DELIVERY ONLY: CPT

## 2018-03-31 PROCEDURE — 85025 COMPLETE CBC W/AUTO DIFF WBC: CPT

## 2018-03-31 RX ORDER — METOCLOPRAMIDE HYDROCHLORIDE 5 MG/ML
10 INJECTION INTRAMUSCULAR; INTRAVENOUS ONCE
Status: COMPLETED | OUTPATIENT
Start: 2018-03-31 | End: 2018-03-31

## 2018-03-31 RX ORDER — DIPHENHYDRAMINE HYDROCHLORIDE 50 MG/ML
12.5 INJECTION INTRAMUSCULAR; INTRAVENOUS EVERY 6 HOURS PRN
Status: DISCONTINUED | OUTPATIENT
Start: 2018-03-31 | End: 2018-04-01

## 2018-03-31 RX ORDER — SODIUM CHLORIDE, SODIUM LACTATE, POTASSIUM CHLORIDE, CALCIUM CHLORIDE 600; 310; 30; 20 MG/100ML; MG/100ML; MG/100ML; MG/100ML
INJECTION, SOLUTION INTRAVENOUS CONTINUOUS
Status: DISCONTINUED | OUTPATIENT
Start: 2018-03-31 | End: 2018-03-31

## 2018-03-31 RX ORDER — VITAMIN A ACETATE, BETA CAROTENE, ASCORBIC ACID, CHOLECALCIFEROL, .ALPHA.-TOCOPHEROL ACETATE, DL-, THIAMINE MONONITRATE, RIBOFLAVIN, NIACINAMIDE, PYRIDOXINE HYDROCHLORIDE, FOLIC ACID, CYANOCOBALAMIN, CALCIUM CARBONATE, FERROUS FUMARATE, ZINC OXIDE, CUPRIC OXIDE 3080; 12; 120; 400; 1; 1.84; 3; 20; 22; 920; 25; 200; 27; 10; 2 [IU]/1; UG/1; MG/1; [IU]/1; MG/1; MG/1; MG/1; MG/1; MG/1; [IU]/1; MG/1; MG/1; MG/1; MG/1; MG/1
1 TABLET, FILM COATED ORAL EVERY MORNING
Status: DISCONTINUED | OUTPATIENT
Start: 2018-03-31 | End: 2018-04-03 | Stop reason: HOSPADM

## 2018-03-31 RX ORDER — METHYLERGONOVINE MALEATE 0.2 MG/ML
0.2 INJECTION INTRAVENOUS
Status: DISCONTINUED | OUTPATIENT
Start: 2018-03-31 | End: 2018-03-31 | Stop reason: HOSPADM

## 2018-03-31 RX ORDER — KETOROLAC TROMETHAMINE 30 MG/ML
30 INJECTION, SOLUTION INTRAMUSCULAR; INTRAVENOUS EVERY 6 HOURS
Status: DISCONTINUED | OUTPATIENT
Start: 2018-03-31 | End: 2018-04-01

## 2018-03-31 RX ORDER — OXYCODONE HYDROCHLORIDE AND ACETAMINOPHEN 5; 325 MG/1; MG/1
2 TABLET ORAL EVERY 4 HOURS PRN
Status: DISCONTINUED | OUTPATIENT
Start: 2018-03-31 | End: 2018-04-01

## 2018-03-31 RX ORDER — METHYLERGONOVINE MALEATE 0.2 MG/ML
0.2 INJECTION INTRAVENOUS
Status: DISCONTINUED | OUTPATIENT
Start: 2018-03-31 | End: 2018-04-03 | Stop reason: HOSPADM

## 2018-03-31 RX ORDER — OXYTOCIN 10 [USP'U]/ML
10 INJECTION, SOLUTION INTRAMUSCULAR; INTRAVENOUS ONCE
Status: DISCONTINUED | OUTPATIENT
Start: 2018-03-31 | End: 2018-03-31 | Stop reason: HOSPADM

## 2018-03-31 RX ORDER — CITRIC ACID/SODIUM CITRATE 334-500MG
30 SOLUTION, ORAL ORAL ONCE
Status: COMPLETED | OUTPATIENT
Start: 2018-03-31 | End: 2018-03-31

## 2018-03-31 RX ORDER — SODIUM CHLORIDE, SODIUM LACTATE, POTASSIUM CHLORIDE, CALCIUM CHLORIDE 600; 310; 30; 20 MG/100ML; MG/100ML; MG/100ML; MG/100ML
1500 INJECTION, SOLUTION INTRAVENOUS ONCE
Status: COMPLETED | OUTPATIENT
Start: 2018-03-31 | End: 2018-03-31

## 2018-03-31 RX ORDER — CARBOPROST TROMETHAMINE 250 UG/ML
250 INJECTION, SOLUTION INTRAMUSCULAR
Status: DISCONTINUED | OUTPATIENT
Start: 2018-03-31 | End: 2018-03-31 | Stop reason: HOSPADM

## 2018-03-31 RX ORDER — HYDROMORPHONE HYDROCHLORIDE 2 MG/ML
0.4 INJECTION, SOLUTION INTRAMUSCULAR; INTRAVENOUS; SUBCUTANEOUS
Status: DISCONTINUED | OUTPATIENT
Start: 2018-03-31 | End: 2018-04-01

## 2018-03-31 RX ORDER — NALOXONE HYDROCHLORIDE 0.4 MG/ML
0.1 INJECTION, SOLUTION INTRAMUSCULAR; INTRAVENOUS; SUBCUTANEOUS PRN
Status: DISCONTINUED | OUTPATIENT
Start: 2018-03-31 | End: 2018-04-01

## 2018-03-31 RX ORDER — SODIUM CHLORIDE, SODIUM LACTATE, POTASSIUM CHLORIDE, CALCIUM CHLORIDE 600; 310; 30; 20 MG/100ML; MG/100ML; MG/100ML; MG/100ML
INJECTION, SOLUTION INTRAVENOUS PRN
Status: DISCONTINUED | OUTPATIENT
Start: 2018-03-31 | End: 2018-04-03 | Stop reason: HOSPADM

## 2018-03-31 RX ORDER — CARBOPROST TROMETHAMINE 250 UG/ML
250 INJECTION, SOLUTION INTRAMUSCULAR
Status: DISCONTINUED | OUTPATIENT
Start: 2018-03-31 | End: 2018-04-03 | Stop reason: HOSPADM

## 2018-03-31 RX ORDER — MISOPROSTOL 200 UG/1
600 TABLET ORAL
Status: DISCONTINUED | OUTPATIENT
Start: 2018-03-31 | End: 2018-04-03 | Stop reason: HOSPADM

## 2018-03-31 RX ORDER — MISOPROSTOL 200 UG/1
800 TABLET ORAL
Status: DISCONTINUED | OUTPATIENT
Start: 2018-03-31 | End: 2018-03-31 | Stop reason: HOSPADM

## 2018-03-31 RX ORDER — ONDANSETRON 2 MG/ML
4 INJECTION INTRAMUSCULAR; INTRAVENOUS EVERY 6 HOURS PRN
Status: DISCONTINUED | OUTPATIENT
Start: 2018-03-31 | End: 2018-04-01

## 2018-03-31 RX ADMIN — SODIUM CHLORIDE, POTASSIUM CHLORIDE, SODIUM LACTATE AND CALCIUM CHLORIDE 1000 ML: 600; 310; 30; 20 INJECTION, SOLUTION INTRAVENOUS at 10:24

## 2018-03-31 RX ADMIN — KETOROLAC TROMETHAMINE 30 MG: 30 INJECTION, SOLUTION INTRAMUSCULAR; INTRAVENOUS at 18:33

## 2018-03-31 RX ADMIN — Medication 125 ML/HR: at 12:20

## 2018-03-31 RX ADMIN — KETOROLAC TROMETHAMINE 30 MG: 30 INJECTION, SOLUTION INTRAMUSCULAR; INTRAVENOUS at 23:50

## 2018-03-31 RX ADMIN — SODIUM CITRATE AND CITRIC ACID MONOHYDRATE 30 ML: 500; 334 SOLUTION ORAL at 10:38

## 2018-03-31 RX ADMIN — DIPHENHYDRAMINE HYDROCHLORIDE 12.5 MG: 50 INJECTION, SOLUTION INTRAMUSCULAR; INTRAVENOUS at 18:35

## 2018-03-31 RX ADMIN — Medication 1 TABLET: at 18:32

## 2018-03-31 RX ADMIN — FAMOTIDINE 20 MG: 10 INJECTION INTRAVENOUS at 10:37

## 2018-03-31 RX ADMIN — METOCLOPRAMIDE 10 MG: 5 INJECTION, SOLUTION INTRAMUSCULAR; INTRAVENOUS at 10:25

## 2018-03-31 ASSESSMENT — PAIN SCALES - GENERAL
PAINLEVEL_OUTOF10: 0
PAINLEVEL_OUTOF10: 3
PAINLEVEL_OUTOF10: 0
PAINLEVEL_OUTOF10: 0

## 2018-03-31 ASSESSMENT — PATIENT HEALTH QUESTIONNAIRE - PHQ9
SUM OF ALL RESPONSES TO PHQ9 QUESTIONS 1 AND 2: 0
1. LITTLE INTEREST OR PLEASURE IN DOING THINGS: NOT AT ALL
2. FEELING DOWN, DEPRESSED, IRRITABLE, OR HOPELESS: NOT AT ALL

## 2018-03-31 ASSESSMENT — LIFESTYLE VARIABLES
DO YOU DRINK ALCOHOL: NO
DO YOU DRINK ALCOHOL: NO
EVER_SMOKED: YES

## 2018-03-31 NOTE — PROGRESS NOTES
LE 1500-32 y.o.  here to LDA3 c/o intermittent abdominal pain since yesterday acompanied by some vaginal spotting. Denies LOF. Reports positive fetal movement. Hx of previous c/s x2. Desires Repeat  Section with BTL. BTL Consent signed at [facility]. Last ate last night at 2300.   0830 5/80/-2/vertex/anterior. Report to Dr. Berumen, Admit order received. Pt prepped and consented for Repeat C/S with BTL.   Report to Bekah Ku RN to assume pt care.
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
## 2018-03-31 NOTE — OR SURGEON
Immediate Post OP Note    PreOp Diagnosis: iup at term active labor previous csection X2 undesired fertility    PostOp Diagnosis: same    Procedure(s):  REPEAT C SECTION WITH TUBAL LIGATION - Wound Class: Clean Contaminated    Surgeon(s):  CAITLYN Magdaleon MD    Anesthesiologist/Type of Anesthesia:  No anesthesia staff entered./Spinal    Surgical Staff:  * No surgical staff found *    Specimens removed if any:  * No specimens in log *    Estimated Blood Loss: 500 cc    Findings: male presentation vertex APGAR 8/9  Uterus Normal, Tubes Normal, ovaries Normal        Complications: none        3/31/2018 12:35 PM Tamiko Dawn M.D.

## 2018-03-31 NOTE — PROGRESS NOTES
Pt states she is passing flatus, diet increased to regular for dinner but pt advised to start out slowly and with smaller amounts at mealtime.

## 2018-03-31 NOTE — OP REPORT
DATE OF SERVICE:  2018    PREOPERATIVE DIAGNOSES:  Intrauterine pregnancy at term with 2 previous   C-sections, active labor, and undesired fertility.    POSTOPERATIVE DIAGNOSES:  Intrauterine pregnancy at term with 2 previous   C-sections, active labor, and undesired fertility.    PROCEDURE PERFORMED:  Repeat  with tubal ligation.    SURGEON:  Tamiko Dawn MD    ASSISTANT:   _____.    ANESTHESIA:  Spinal performed.    ANESTHESIOLOGIST:  Dr. Mendosa.    ESTIMATED BLOOD LOSS:  500 mL.    INTRAOPERATIVE FINDINGS:  A male infant in vertex presentation with Apgars of   8 and 9.  Normal uterus, tubes, and ovaries.    PROCEDURE IN DETAIL:  After informed consent had been obtained, the patient   was taken to the operating room where spinal anesthesia was then found to be   adequate.  She was prepped and draped in the usual sterile fashion and a   Pfannenstiel skin incision made around the previous incision and the   intervening skin removed.  The incision was carried down to the underlying   layer of fascia with the knife.  The fascia was then nicked in the midline,   elevated and the fascial incision extended laterally on both sides with Richardson   scissors.  The rectus muscles were then dissected off the posterior aspect of   the fascia both superiorly and inferiorly.  They were then  in the   midline.  Peritoneum was elevated between 2 hemostats, entered sharply with   Metzenbaum scissors and the peritoneal incision then extended superiorly and   inferiorly with good visualization of bowel and bladder.  The vesicouterine   peritoneum identified and a bladder flap created.  The uterus incised in a low   transverse fashion and extended bluntly.  The male infant was then delivered   through the incision without difficulty.  The cord was clamped x2 and cut and   the infant handed to awaiting nursing staff.  The placenta then delivered   manually.  We were unable to exteriorize the uterus, but it  remained in situ,   it was then cleaned out with a laparotomy sponge and the uterine incision   closed with a #1 chromic in a running locked fashion.  A second layer of the   same suture was then used to obtain hemostasis.  Once hemostasis was noted to   be adequate, attention was turned to the tubes.  They were identified,   followed out to their fimbriated ends, and a Filshie clip placed over the   complete lumen of each tube in an avascular area.  Once the Filshie clips had   been applied, the uterus was once again checked for hemostasis, which was   found to be adequate.  At that point, the peritoneum was closed with 0 Vicryl.    The muscles were reapproximated with a Vicryl stitch.  The fascia closed   with 0 Vicryl in a running fashion.  The subcutaneous layers were checked for   hemostasis, noted to be adequate, reapproximated with a 2-0 plain and the skin   was closed with staples.  Sponge, lap, needle counts were all correct x2 and   the patient is taken to the recovery room in good condition.       ____________________________________     MD CADENCE CRAIG / RAYSA    DD:  03/31/2018 12:40:57  DT:  03/31/2018 13:30:58    D#:  [id number]  Job#:  [id number]

## 2018-04-01 LAB
ERYTHROCYTE [DISTWIDTH] IN BLOOD BY AUTOMATED COUNT: 46.5 FL (ref 35.9–50)
HCT VFR BLD AUTO: 33.3 % (ref 37–47)
HGB BLD-MCNC: 11 G/DL (ref 12–16)
MCH RBC QN AUTO: 30.6 PG (ref 27–33)
MCHC RBC AUTO-ENTMCNC: 33 G/DL (ref 33.6–35)
MCV RBC AUTO: 92.8 FL (ref 81.4–97.8)
PLATELET # BLD AUTO: 111 K/UL (ref 164–446)
PMV BLD AUTO: 12.9 FL (ref 9–12.9)
RBC # BLD AUTO: 3.59 M/UL (ref 4.2–5.4)
WBC # BLD AUTO: 10.3 K/UL (ref 4.8–10.8)

## 2018-04-01 PROCEDURE — A9270 NON-COVERED ITEM OR SERVICE: HCPCS | Performed by: OBSTETRICS & GYNECOLOGY

## 2018-04-01 PROCEDURE — 85027 COMPLETE CBC AUTOMATED: CPT

## 2018-04-01 PROCEDURE — 36415 COLL VENOUS BLD VENIPUNCTURE: CPT

## 2018-04-01 PROCEDURE — A9270 NON-COVERED ITEM OR SERVICE: HCPCS | Performed by: ANESTHESIOLOGY

## 2018-04-01 PROCEDURE — 700102 HCHG RX REV CODE 250 W/ 637 OVERRIDE(OP): Performed by: ANESTHESIOLOGY

## 2018-04-01 PROCEDURE — 700111 HCHG RX REV CODE 636 W/ 250 OVERRIDE (IP): Performed by: ANESTHESIOLOGY

## 2018-04-01 PROCEDURE — 700102 HCHG RX REV CODE 250 W/ 637 OVERRIDE(OP): Performed by: OBSTETRICS & GYNECOLOGY

## 2018-04-01 PROCEDURE — 770002 HCHG ROOM/CARE - OB PRIVATE (112)

## 2018-04-01 RX ORDER — IBUPROFEN 600 MG/1
600 TABLET ORAL EVERY 6 HOURS PRN
Status: DISCONTINUED | OUTPATIENT
Start: 2018-04-01 | End: 2018-04-03 | Stop reason: HOSPADM

## 2018-04-01 RX ORDER — OXYCODONE HYDROCHLORIDE 5 MG/1
5 TABLET ORAL EVERY 4 HOURS PRN
Status: DISCONTINUED | OUTPATIENT
Start: 2018-04-01 | End: 2018-04-03 | Stop reason: HOSPADM

## 2018-04-01 RX ORDER — ONDANSETRON 2 MG/ML
4 INJECTION INTRAMUSCULAR; INTRAVENOUS EVERY 6 HOURS PRN
Status: DISCONTINUED | OUTPATIENT
Start: 2018-04-01 | End: 2018-04-03 | Stop reason: HOSPADM

## 2018-04-01 RX ORDER — MORPHINE SULFATE 4 MG/ML
4 INJECTION, SOLUTION INTRAMUSCULAR; INTRAVENOUS
Status: DISCONTINUED | OUTPATIENT
Start: 2018-04-01 | End: 2018-04-03 | Stop reason: HOSPADM

## 2018-04-01 RX ORDER — OXYCODONE HYDROCHLORIDE 10 MG/1
10 TABLET ORAL EVERY 4 HOURS PRN
Status: DISCONTINUED | OUTPATIENT
Start: 2018-04-01 | End: 2018-04-03 | Stop reason: HOSPADM

## 2018-04-01 RX ORDER — ONDANSETRON 4 MG/1
4 TABLET, ORALLY DISINTEGRATING ORAL EVERY 6 HOURS PRN
Status: DISCONTINUED | OUTPATIENT
Start: 2018-04-01 | End: 2018-04-03 | Stop reason: HOSPADM

## 2018-04-01 RX ADMIN — OXYCODONE HYDROCHLORIDE 10 MG: 10 TABLET ORAL at 09:08

## 2018-04-01 RX ADMIN — Medication 1 TABLET: at 09:13

## 2018-04-01 RX ADMIN — IBUPROFEN 600 MG: 600 TABLET, FILM COATED ORAL at 16:03

## 2018-04-01 RX ADMIN — OXYCODONE HYDROCHLORIDE AND ACETAMINOPHEN 2 TABLET: 5; 325 TABLET ORAL at 04:24

## 2018-04-01 RX ADMIN — IBUPROFEN 600 MG: 600 TABLET, FILM COATED ORAL at 09:13

## 2018-04-01 RX ADMIN — KETOROLAC TROMETHAMINE 30 MG: 30 INJECTION, SOLUTION INTRAMUSCULAR; INTRAVENOUS at 05:39

## 2018-04-01 RX ADMIN — OXYCODONE HYDROCHLORIDE 10 MG: 10 TABLET ORAL at 17:35

## 2018-04-01 RX ADMIN — OXYCODONE HYDROCHLORIDE 10 MG: 10 TABLET ORAL at 13:21

## 2018-04-01 ASSESSMENT — PAIN SCALES - GENERAL
PAINLEVEL_OUTOF10: 6
PAINLEVEL_OUTOF10: 5
PAINLEVEL_OUTOF10: 1
PAINLEVEL_OUTOF10: 6
PAINLEVEL_OUTOF10: 5
PAINLEVEL_OUTOF10: 6

## 2018-04-01 NOTE — PROGRESS NOTES
"Received patient to room S-312  via gurney.  Report received from FLORESITA Godfrey RN and assumed care of patient.  Nursing assessment done.  She denies pain.  She was oriented to room, call light, get well channel, infant security, emergency light, and unit routine.  Plan of care discussed.  Encouraged to call with any needs.  Two infant bands and cuddle system tag number checked for accuracy with \"Bekah \"FLORESITA RN when transferred to postpartum at this time. FOB at bedside.   "

## 2018-04-01 NOTE — PROGRESS NOTES
Name:   Alyse Fernandez   Date/Time:  2018 6:46 AM  Gestational Age:  38w0d  Admit Date:   3/31/2018  Admitting Dx:   Pregnancy  Labor and delivery, indication for care  Labor and delivery, indication for care    POD# 1 S/P low transverse c/s    S:  Abdominal pain no   Ambulating   yes  Tolerating PO  yes  Flatus    yes  Bleeding   Yes, decreasing  Voiding   N\A, tyson just removed this am   Dizziness   no  Breast feeding  yes  Breast tenderness  no    O:  Pulse: 76  Blood Pressure: 116/70     Temp  Av.7 °C (98 °F)  Min: 36.2 °C (97.1 °F)  Max: 37.1 °C (98.7 °F)  Heart: regular rate and rhythm without gallops or murmurs  Lungs: clear bases  Abdomen: flat and tender / bowelsounds present / incision clean and dry.  Extremities: non-tender  Catheter: DC'd    Intake/Output Summary (Last 24 hours) at 18 0646  Last data filed at 18 1810   Gross per 24 hour   Intake             2525 ml   Output              750 ml   Net             1775 ml     Recent Labs      18   0930  18   0354   WBC  9.1  10.3   RBC  4.11*  3.59*   HEMOGLOBIN  12.8  11.0*   HEMATOCRIT  37.9  33.3*   MCV  92.2  92.8   MCH  31.1  30.6   RDW  46.2  46.5   PLATELETCT  121*  111*   MPV  12.2  12.9   NEUTSPOLYS  68.40   --    LYMPHOCYTES  24.80   --    MONOCYTES  5.00   --    EOSINOPHILS  0.70   --    BASOPHILS  0.30   --      A:  POD# 1 S/P low transverse c/s  Stable/progressing well    P:  Routine C/S Postpartum care, continue pain management, encourage ambulation, anticipate DC POD#3     rGaciela Berumen M.D.

## 2018-04-01 NOTE — PROGRESS NOTES
Assumed care of patient at 1915, received report from day RN at that time. Communication board updated and reviewed POC with pt. Pt denies pain.  Assessment complete. Cardenas in place.  on. SCDs on. No other needs at this time. Call light and personal belongings within reach.

## 2018-04-01 NOTE — PROGRESS NOTES
Report received from GREGORY Fowler. Pt is tSalin, NB at bedside. Assessment completed. Fundus is firm, lochia light, patient has voided. Pt complaining of pain 7/10 in her back and abdomen.  Medicated per MAR. Pt ambulates independently. Pt educated regarding plan of care, including NB care and pain management. All questions answered. Call light and personal belongings in reach. No additional needs at this time.

## 2018-04-01 NOTE — CARE PLAN
Problem: Potential for postpartum infection related to surgical incision, compromised uterine condition, urinary tract or respiratory compromise  Goal: Patient will be afebrile and free from signs and symptoms of infection  Outcome: PROGRESSING AS EXPECTED  Patient has no signs/symptoms of infection.  Vital signs stable.     Problem: Alteration in comfort related to surgical incision and/or after birth pains  Goal: Patient verbalizes acceptable pain level  Outcome: PROGRESSING AS EXPECTED  Patient states pain control is adequate.

## 2018-04-02 LAB
APPEARANCE UR: CLEAR
COLOR UR AUTO: YELLOW
GLUCOSE UR QL STRIP.AUTO: NEGATIVE MG/DL
KETONES UR QL STRIP.AUTO: NEGATIVE MG/DL
LEUKOCYTE ESTERASE UR QL STRIP.AUTO: NEGATIVE
NITRITE UR QL STRIP.AUTO: NEGATIVE
PH UR STRIP.AUTO: 6 [PH]
PROT UR QL STRIP: NEGATIVE MG/DL
RBC UR QL AUTO: ABNORMAL
SP GR UR: 1.01

## 2018-04-02 PROCEDURE — A9270 NON-COVERED ITEM OR SERVICE: HCPCS | Performed by: OBSTETRICS & GYNECOLOGY

## 2018-04-02 PROCEDURE — 770002 HCHG ROOM/CARE - OB PRIVATE (112)

## 2018-04-02 PROCEDURE — 700102 HCHG RX REV CODE 250 W/ 637 OVERRIDE(OP): Performed by: OBSTETRICS & GYNECOLOGY

## 2018-04-02 RX ADMIN — IBUPROFEN 600 MG: 600 TABLET, FILM COATED ORAL at 08:19

## 2018-04-02 RX ADMIN — OXYCODONE HYDROCHLORIDE 10 MG: 10 TABLET ORAL at 00:08

## 2018-04-02 RX ADMIN — OXYCODONE HYDROCHLORIDE 10 MG: 10 TABLET ORAL at 10:24

## 2018-04-02 RX ADMIN — Medication 1 TABLET: at 08:19

## 2018-04-02 RX ADMIN — OXYCODONE HYDROCHLORIDE 10 MG: 10 TABLET ORAL at 05:34

## 2018-04-02 RX ADMIN — OXYCODONE HYDROCHLORIDE 10 MG: 10 TABLET ORAL at 18:50

## 2018-04-02 RX ADMIN — IBUPROFEN 600 MG: 600 TABLET, FILM COATED ORAL at 00:08

## 2018-04-02 RX ADMIN — OXYCODONE HYDROCHLORIDE 10 MG: 10 TABLET ORAL at 14:53

## 2018-04-02 ASSESSMENT — PAIN SCALES - GENERAL
PAINLEVEL_OUTOF10: 7
PAINLEVEL_OUTOF10: 6
PAINLEVEL_OUTOF10: 6
PAINLEVEL_OUTOF10: 3
PAINLEVEL_OUTOF10: 1
PAINLEVEL_OUTOF10: 7
PAINLEVEL_OUTOF10: 2
PAINLEVEL_OUTOF10: 3
PAINLEVEL_OUTOF10: 7
PAINLEVEL_OUTOF10: 7

## 2018-04-02 ASSESSMENT — LIFESTYLE VARIABLES: DO YOU DRINK ALCOHOL: NO

## 2018-04-02 NOTE — PROGRESS NOTES
Report received from GREGORY Rose. Pt is AAMarleny4, NB at bedside. Assessment completed. Fundus is firm, lochia light. Pt complaining of pain 7/10 in her head.  Medicated per MAR. Pt ambulates independently. Pt educated regarding plan of care, including pain management and NB care. All questions answered. Call light and personal belongings in reach. No additional needs at this time.
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
## 2018-04-02 NOTE — PROGRESS NOTES
Post Partum Progress Note    Name:   Alyse Fernandez   Date/Time:  2018 - 6:44 AM  Chief Admitting Dx:  Pregnancy  Labor and delivery, indication for care  Labor and delivery, indication for care  Delivery Type:   for repeat  Post-Op/Post Partum Days #:  2    Subjective:  Abdominal pain: no  Ambulating:   yes  Tolerating liquids:  yes  Tolerating food:  yes common adult  Flatus:   yes  BM:    no  Bleeding:   with a small amount of bleeding  Voiding:   yes  Dizziness:   no  Feeding:   bottle    Vitals:    18 0400 18 0645 18 1600 18 2100   BP: 116/70 (!) 98/52 126/70 114/74   Pulse: 76 72 71 64   Resp: 16 20 16 16   Temp: 36.4 °C (97.6 °F) 36.3 °C (97.4 °F) 36.6 °C (97.9 °F) 36.9 °C (98.4 °F)   TempSrc:       SpO2: 96% 93% 97% 98%   Weight:       Height:           Exam:  Breast: No tenderness or engorgement  Abdomen: Abdomen soft, non-tender. BS normal. No masses,  No organomegaly  Fundal Tenderness:  no  Fundus Firm: yes  Incision: no evidence of infection, separation or keloid formation.  Below umbilicus: yes  Perineum: perineum intact  Lochia: mild  Extremities: trace extremities, peripheral pulses and reflexes normal    Meds:  Current Facility-Administered Medications   Medication Dose   • ibuprofen (MOTRIN) tablet 600 mg  600 mg   • oxyCODONE immediate-release (ROXICODONE) tablet 5 mg  5 mg   • oxyCODONE immediate release (ROXICODONE) tablet 10 mg  10 mg   • morphine (pf) 4 mg/ml injection 4 mg  4 mg   • ondansetron (ZOFRAN) syringe/vial injection 4 mg  4 mg    Or   • ondansetron (ZOFRAN ODT) dispertab 4 mg  4 mg   • oxytocin (PITOCIN) infusion (for postpartum)   mL/hr   • LR infusion     • PRN oxytocin (PITOCIN) (20 Units/1000 mL) PRN for excessive uterine bleeding - See Admin Instr  125-999 mL/hr   • methylergonovine (METHERGINE) injection 0.2 mg  0.2 mg   • miSOPROStol (CYTOTEC) tablet 600 mcg  600 mcg   • carboPROST (HEMABATE) injection 250 mcg  250 mcg   •  prenatal plus vitamin (STUARTNATAL 1+1) 27-1 MG tablet 1 Tab  1 Tab       Labs:   Recent Labs      18   0930  18   0354   WBC  9.1  10.3   RBC  4.11*  3.59*   HEMOGLOBIN  12.8  11.0*   HEMATOCRIT  37.9  33.3*   MCV  92.2  92.8   MCH  31.1  30.6   MCHC  33.8  33.0*   RDW  46.2  46.5   PLATELETCT  121*  111*   MPV  12.2  12.9       Assessment:  Chief Admitting Dx:  Pregnancy  Labor and delivery, indication for care  Labor and delivery, indication for care  Delivery Type:   for repeat  Tubal Ligation:  no    Plan:  Continue routine post partum care. Advance care, encourage ambulation, pain control, anticipate d/c home tomorrow, POD#3/.    DAMION Perez.

## 2018-04-02 NOTE — PROGRESS NOTES
Received bedside report from GREGORY Junior. Discussed plan of care. Patient will call for pain medication as needed. All needs met at this time.

## 2018-04-03 VITALS
HEART RATE: 74 BPM | BODY MASS INDEX: 33.23 KG/M2 | HEIGHT: 61 IN | RESPIRATION RATE: 17 BRPM | DIASTOLIC BLOOD PRESSURE: 74 MMHG | TEMPERATURE: 98.6 F | OXYGEN SATURATION: 96 % | SYSTOLIC BLOOD PRESSURE: 109 MMHG | WEIGHT: 176 LBS

## 2018-04-03 PROCEDURE — A9270 NON-COVERED ITEM OR SERVICE: HCPCS | Performed by: OBSTETRICS & GYNECOLOGY

## 2018-04-03 PROCEDURE — 700102 HCHG RX REV CODE 250 W/ 637 OVERRIDE(OP): Performed by: OBSTETRICS & GYNECOLOGY

## 2018-04-03 RX ORDER — OXYCODONE HYDROCHLORIDE 5 MG/1
5 TABLET ORAL EVERY 6 HOURS PRN
Qty: 15 TAB | Refills: 0 | Status: SHIPPED | OUTPATIENT
Start: 2018-04-03 | End: 2018-04-10

## 2018-04-03 RX ORDER — IBUPROFEN 600 MG/1
600 TABLET ORAL EVERY 6 HOURS PRN
Qty: 30 TAB | Refills: 0 | Status: SHIPPED | OUTPATIENT
Start: 2018-04-03 | End: 2020-01-19

## 2018-04-03 RX ADMIN — OXYCODONE HYDROCHLORIDE 10 MG: 10 TABLET ORAL at 12:10

## 2018-04-03 RX ADMIN — OXYCODONE HYDROCHLORIDE 10 MG: 10 TABLET ORAL at 08:03

## 2018-04-03 RX ADMIN — IBUPROFEN 600 MG: 600 TABLET, FILM COATED ORAL at 02:27

## 2018-04-03 RX ADMIN — OXYCODONE HYDROCHLORIDE 5 MG: 5 TABLET ORAL at 02:28

## 2018-04-03 RX ADMIN — Medication 1 TABLET: at 08:03

## 2018-04-03 RX ADMIN — IBUPROFEN 600 MG: 600 TABLET, FILM COATED ORAL at 10:21

## 2018-04-03 ASSESSMENT — PAIN SCALES - GENERAL
PAINLEVEL_OUTOF10: 5
PAINLEVEL_OUTOF10: 6
PAINLEVEL_OUTOF10: 5
PAINLEVEL_OUTOF10: 6
PAINLEVEL_OUTOF10: 6

## 2018-04-03 NOTE — CARE PLAN
Problem: Altered physiologic condition related to postoperative  delivery  Goal: Patient physiologically stable as evidenced by normal lochia, palpable uterine involution and vital signs within normal limits  Outcome: PROGRESSING AS EXPECTED  Vital signs stable. Fundus firm, lochia light    Problem: Alteration in comfort related to surgical incision and/or after birth pains  Goal: Patient is able to ambulate, care for self and infant with acceptable pain level  Outcome: PROGRESSING AS EXPECTED  Discussed 0-10 pain scale and available prn pain medications with pt. Pt states pain at acceptable level at this time

## 2018-04-03 NOTE — PROGRESS NOTES
Patient found to be co-sleeping with NB. Patient was again educated about co-sleeping and she understands that it is unsafe, but continues to be found co-sleeping with NB despite education.

## 2018-04-03 NOTE — DISCHARGE PLANNING
Medical Social Work    SW received call from Sriram Granger from [facility] who confirmed  to come see MOB and infant today. MOB and infant not to be discharged.

## 2018-04-03 NOTE — DISCHARGE INSTRUCTIONS
POSTPARTUM DISCHARGE INSTRUCTIONS FOR MOM    Nothing in the vagina (ie Tampons, douching, intercourse,...) for until follow-up in the office or for 5 weeks.   No soaking in bathtubs or hot tubs until follow-up appointment or for 5 weeks.   Return to ER or see your primary care physician if your symptoms worsen or for any new problems, questions or concerns.    YOB: 1985   Age: 32 y.o.               Admit Date: 3/31/2018     Discharge Date: 4/3/2018  Attending Doctor:  Tamiko Dawn M.D.                  Allergies:  Patient has no known allergies.    Discharged to home by car. Discharged via wheelchair, hospital escort: Yes.  Special equipment needed: Not Applicable  Belongings with: Personal  Be sure to schedule a follow-up appointment with your primary care doctor or any specialists as instructed.     Discharge Plan:   Smoking Cessation Offered: Patient Refused  Influenza Vaccine Indication: Not indicated: Previously immunized this influenza season and > 8 years of age    REASONS TO CALL YOUR OBSTETRICIAN:    1.   Persistent fever or shaking chills (Temperature higher than 100.4)  2.   Heavy bleeding (soaking more than 1 pad per hour); Passing clots  3.   Foul odor from vagina  4.   Mastitis (Breast infection; breast pain, chills, fever, redness)  5.   Urinary pain, burning or frequency  6.   Episiotomy infection  7.   Abdominal incision infection  8.   Severe depression longer than 24 hours      HAND WASHING  · Prior to handling the baby.  · Before breastfeeding or bottle feeding baby.  · After using the bathroom.    WOUND CARE  ·  Incision:    Keep clean and dry.  Do NOT lift anything heavier than your baby for up to 6 weeks.  There should not be any opening or pus.  Remove steristrips after 5 days if still present.  · Episiotomy/Laceration:    Use tucks or Dermoplast spray, Sitz Bath as needed (6 inches of warm water), continue to use zulema bottle until bleeding stops.    BREAST  "CARE:  · Wear supportive bra.  · No soap on breast or nipples if breastfeeding.  · Apply lanolin for sore cracked nipples.  Do NOT wash lanolin off prior to breastfeeding.    VAGINAL CARE  · Nothing inside vagina for 6 weeks: NO sex, No douching, No tampons.  · Bleeding may continue for 2-4 weeks.  Amount may vary.  Call your OB doctor for heavy bleeding which means soaking more than 1 pad per hour.    BIRTH CONTROL  · It is possible to become pregnant at any time after delivery and while breastfeeding.  · Plan to discuss with your doctor a method of birth control at your 6 week visit.    DIET AND ELIMINATION  · Eating more fiber (bran cereal, fruits, and vegetables) AND drinking lots of fluids will help to avoid constipation.  · Urinary frequency after childbirth is normal.    POSTPARTUM BLUES  During the first few days after birth, you may experience a sense of the \"blues\" including:  · Impatience, irritability, or crying    These feelings come and go quickly.  However, as many as one in ten women experience emotional symptoms known a postpartum depression.    Postpartum depression:  May start as early as the second or third day after delivery or take several weeks or months to develop.  Symptoms of \"blues\" are present, but are more intense.    ·        Loss of appetite  ·        Crying spells  ·        Feelings of hopelessness or loss of control  ·        Over concern or no concern at all about the baby  ·        Fear of touching the baby  ·        Little or no concern about your own appearance  ·        Inability to sleep or need of excessive sleep    Any depression over 24 hours should be reported to your obstetrician.      Crisis Hotline:  [place] Crisis Hotline:  3-827-QOWCOHV  Or 1-978.421.7238  [place] Crisis Hotline:  1-231.167.1486  Or 263-215-1382      Quit Smoking / Tobacco Use:    I understand the use of any tobacco products increases my chance of suffering from future heart disease and could cause " other illnesses which may shorten my life. Quitting the use of tobacco products is the single most important thing I can do to improve my health. For further information on smoking / tobacco cessation call a Toll Free Quit Line at 1-582.365.8703 (*[employer] [place]) or 1-916.707.3227 (American Lung Association) or you can access the web based program at www.lungusa.org.    [place] Tobacco Users Help Line:  (371) 622-1484       Toll Free: 1-300.511.1285  ADDITIONAL EDUCATIONAL MATERIALS GIVEN TO PATIENT:      My signature on this form indicates that:    1.  I have reviewed and understand the above information  2.  My questions regarding this information have been answered to my satisfaction.  3.  I have formulated a plan with my discharge nurse to obtain my prescribed medication for home.

## 2018-04-03 NOTE — DISCHARGE SUMMARY
Discharge Summary:      Alyse Fernandez      Admit Date:   3/31/2018  Discharge Date:  4/3/2018     Admitting diagnosis:  Pregnancy  Labor and delivery, indication for care    Discharge Diagnosis: Status post  for repeat.  Pregnancy Complications: none,   Tubal Ligation:  yes        History:  Past Medical History:   Diagnosis Date   • Depression    • Substance abuse     meth    •  (vaginal birth after )      OB History    Para Term  AB Living   11 3 3   7 3   SAB TAB Ectopic Molar Multiple Live Births   1   1     3      # Outcome Date GA Lbr Binh/2nd Weight Sex Delivery Anes PTL Lv   11 Current            10 AB 2016              Birth Comments: Pt states no complicatios   9 SAB  14w0d             Birth Comments: Pt states had a D/C   8 Term 11 39w0d   M CS-LTranv Spinal N SHYLA      Birth Comments: Repeat C/S   7 AB               Birth Comments: Pt states no complications.    6 AB               Birth Comments: Pt states no complications.    5 Term 06 39w0d  2.863 kg (6 lb 5 oz) M Vag-Spont EPI N SHYLA      Birth Comments: Pt states had    4 AB               Birth Comments: Pt states no complications.    3 Term 04 39w0d  2.438 kg (5 lb 6 oz) F CS-LTranv Spinal N SHYLA      Birth Comments: Primary C/S pt states baby had tachycardia   2 Ectopic               Birth Comments: Pt states had an ectopic pregnancy    1 AB               Birth Comments: Pt states no complications.            Patient has no known allergies.  Patient Active Problem List    Diagnosis Date Noted   • History of 2  sections 2017     Priority: High   • History of methamphetamine use 2017     Priority: High   • Prenatal care, subsequent pregnancy in second trimester 2017     Priority: Medium   • Group beta Strep positive 2018        Hospital Course:   32 y.o. , now para 4, was admitted with the above mentioned  diagnosis, underwent Active Labor,  for repeat. Patient postpartum course was unremarkable, with progressive advancement in diet , ambulation and toleration of oral analgesia. Patient complains of swelling in the left axilla, which started during this pregnancy and got little bigger after deliver. It is palpable roud mass, no surrounding cellulitis, erythema or edema seen, no discharge.The patient desires discharge today.       Vitals:    18 1600 18 2100 18 0800 18   BP: 126/70 114/74 121/75 138/78   Pulse: 71 64 79 84   Resp:  16   Temp: 36.6 °C (97.9 °F) 36.9 °C (98.4 °F) 37.1 °C (98.8 °F) 37.6 °C (99.7 °F)   TempSrc:       SpO2: 97% 98% 95% 96%   Weight:       Height:           Current Facility-Administered Medications   Medication Dose   • ibuprofen (MOTRIN) tablet 600 mg  600 mg   • oxyCODONE immediate-release (ROXICODONE) tablet 5 mg  5 mg   • oxyCODONE immediate release (ROXICODONE) tablet 10 mg  10 mg   • morphine (pf) 4 mg/ml injection 4 mg  4 mg   • ondansetron (ZOFRAN) syringe/vial injection 4 mg  4 mg    Or   • ondansetron (ZOFRAN ODT) dispertab 4 mg  4 mg   • oxytocin (PITOCIN) infusion (for postpartum)   mL/hr   • LR infusion     • PRN oxytocin (PITOCIN) (20 Units/1000 mL) PRN for excessive uterine bleeding - See Admin Instr  125-999 mL/hr   • methylergonovine (METHERGINE) injection 0.2 mg  0.2 mg   • miSOPROStol (CYTOTEC) tablet 600 mcg  600 mcg   • carboPROST (HEMABATE) injection 250 mcg  250 mcg   • prenatal plus vitamin (STUARTNATAL 1+1) 27-1 MG tablet 1 Tab  1 Tab       Exam:  Breast Exam: negative  Abdomen: Abdomen soft, non-tender. BS normal. No masses,  No organomegaly  Fundus Non Tender: yes  LEFT AXILLA: ca 2x2cm palpable mass without surrounding erythema or edema  Incision: dry and intact  Perineum: perineum intact    Extremity: no edema, redness or tenderness in the calves or thighs     Labs:  Recent Labs      18   0930  18    0354   WBC  9.1  10.3   RBC  4.11*  3.59*   HEMOGLOBIN  12.8  11.0*   HEMATOCRIT  37.9  33.3*   MCV  92.2  92.8   MCH  31.1  30.6   MCHC  33.8  33.0*   RDW  46.2  46.5   PLATELETCT  121*  111*   MPV  12.2  12.9        Activity:   Discharge to home  Pelvic Rest x 6 weeks    Assessment:  Enlarged lymph node left axila whiteout erythema or edema   normal postpartum course  Discharge Assessment: Taking adequate diet and fluids, No heavy bleeding or foul vaginal discharge , Voiding without difficulty     Follow up: .[facility] or [facility] in 1 week for incision check.   To resume daily PNV and iron supplement if needed with hydration.   Patient to RT [facility] or ER if any of the following occur:  Fever over 100.5  Severe abdominal pain  Red streaks or painful masses in the breasts  Foul smelling discharge or lochia  Heavy vaginal bleeding saturating a pad per hour  S/s of PP depression     Discharge Meds:   Current Outpatient Prescriptions   Medication Sig Dispense Refill   • oxyCODONE immediate-release (ROXICODONE) 5 MG Tab Take 1 Tab by mouth every 6 hours as needed for up to 7 days. 15 Tab 0   • ibuprofen (MOTRIN) 600 MG Tab Take 1 Tab by mouth every 6 hours as needed (For cramping after delivery; do not give if patient is receiving ketorolac (Toradol)). 30 Tab 0       Graciela Berumen M.D.

## 2018-04-03 NOTE — DISCHARGE PLANNING
Medical Social Work    REYES spoke with Sloane Ball from [facility] who confirmed adoption case with MOB's oldest child. She is recommending new report be made on infant.     REYES called [facility] at [phone] and spoke with Sriram Granger to file report. Sriram to speak with supervisor and follow up with this SW.

## 2018-04-03 NOTE — DISCHARGE PLANNING
Medical Social Work    Komal Delgado and Jennifer from [facility] met with POB at bedside to complete assessment. [facility]  provided them with MOB and infant's medical records. Komal and Jennifer report that removal is borderline and will need assess POB's home before a determination of removal is made. [facility] workers to notify house SW of determination this evening. Jennifer can be reached at [phone].

## 2018-04-03 NOTE — PROGRESS NOTES
Report received from GREGORY Wright. Pt is SAVANA Gant at bedside. Assessment completed. Fundus is firm, lochia light, pad changed. Pt complaining of pain 6/10 in her uterus.  Medicated per MAR. Pt ambulates independently. Pt educated regarding plan of care, including pain management, social work clearance needed, pending discharge. All questions answered. Call light and personal belongings in reach. No additional needs at this time.

## 2018-04-03 NOTE — PROGRESS NOTES
Upon entering the room patient was co-sleeping with NB. Patient has been educated repeatedly about safety and co-sleeping and patient verbalizes understanding, but continues to be found co-sleeping with NB when staff enters the room.

## 2018-04-03 NOTE — PROGRESS NOTES
Received report from GURMEET Madrid RN. Assessment complete. Pt states pain at acceptable level. Discussed pain management plan with pt. Pt will call for PRN pain meds. Pt educated on the dangers of sleeping with infant. Call light within reach. Pt encouraged to call with needs or concerns.

## 2018-04-03 NOTE — CARE PLAN
Problem: Discharge Barriers/Planning  Goal: Patient's continuum of care needs will be met  Social work clearance needed.    Problem: Pain Management  Goal: Pain level will decrease to patient's comfort goal    Intervention: Follow pain managment plan developed in collaboration with patient and Interdisciplinary Team  Patient reporting 8/10. Medicated per MAR. Patient reporting mild relief with each pain medication administration. Patient encouraged to ambulate, patient declined.

## 2018-04-04 NOTE — DISCHARGE PLANNING
Medical Social Work    Referral: CPS    Intervention: REYES received phone call from Jennifer at [facility] [phone]. REYES called Jennifer back. Per Jennifer, mom and baby are clear to discharge home. Bedside GREGORY Junior updated on POC.    Plan: SW to remain available.

## 2018-04-18 ENCOUNTER — POST PARTUM (OUTPATIENT)
Dept: OBGYN | Facility: CLINIC | Age: 33
End: 2018-04-18
Payer: MEDICAID

## 2018-04-18 VITALS — WEIGHT: 163 LBS | DIASTOLIC BLOOD PRESSURE: 62 MMHG | SYSTOLIC BLOOD PRESSURE: 118 MMHG | BODY MASS INDEX: 30.8 KG/M2

## 2018-04-18 DIAGNOSIS — Z09 POSTOP CHECK: ICD-10-CM

## 2018-04-18 PROCEDURE — 99024 POSTOP FOLLOW-UP VISIT: CPT | Performed by: OBSTETRICS & GYNECOLOGY

## 2018-04-18 RX ORDER — IBUPROFEN 800 MG/1
800 TABLET ORAL EVERY 8 HOURS PRN
Qty: 30 TAB | Refills: 3 | Status: SHIPPED | OUTPATIENT
Start: 2018-04-18 | End: 2020-01-19

## 2018-04-18 NOTE — LETTER
April 18, 2018         Patient: Alyse Fernandez   YOB: 1985   Date of Visit: 4/18/2018           To Whom it May Concern:    Alyse Fernandez was seen in my clinic on 4/18/2018. Patient was brought in to the appoinment by her spouse Willi Bunch, please excuse him from work today. If you have any questions or concerns, please don't hesitate to call.        Sincerely,           Garry Powell M.D.  Electronically Signed

## 2018-04-18 NOTE — NON-PROVIDER
Pt here for C/S check, C/S on 3/31/2018w BTL   Pharmacy Confirmed  Phone Number: [phone]  C/O: None

## 2018-04-18 NOTE — PROGRESS NOTES
SUBJECTIVE:  Alyse Fernandez presents to the clinic 2.5 weeks following C/S : stil with staples  ???    Eating a regular diet without difficulty. Bowel movement are Normal.  Pain is not well controlled.  Medication(s) being used: prescription NSAID's including ibuprofen (Motrin). Spotting. Patient Denies Incisional pain, drainage or redness    OBJECTIVE:  /62   Wt 73.9 kg (163 lb)   LMP 07/16/2017   Breastfeeding? Unknown   BMI 30.80 kg/m²   Current Outpatient Prescriptions on File Prior to Visit   Medication Sig Dispense Refill   • ibuprofen (MOTRIN) 600 MG Tab Take 1 Tab by mouth every 6 hours as needed (For cramping after delivery; do not give if patient is receiving ketorolac (Toradol)). 30 Tab 0   • Prenatal MV-Min-Fe Fum-FA-DHA (PRENATAL 1 PO) Take  by mouth.       No current facility-administered medications on file prior to visit.        Constitutional:  alert, no distress.  Abdomen:  soft, bowel sounds active, non-tender.  Incision:  healing well, no drainage, no erythema, no hernia, no seroma, no swelling, poor approximation ig edges , staples : no drainage , no erythema , no dehiscence, incision well approximated.    IMPRESSION: Postoperative course complicated by delayed removal of Staples   Wound care , steri strips , add motrin    Lab:   Recent Results (from the past 1008 hour(s))   POC UA    Collection Time: 03/07/18  7:04 PM   Result Value Ref Range    POC Color Yellow     POC Appearance Cloudy (A)     POC Glucose Negative Negative mg/dL    POC Ketones Negative Negative mg/dL    POC Specific Gravity 1.020 1.005 - 1.030    POC Blood Negative Negative    POC Urine PH 7.0 5.0 - 8.0    POC Protein Negative Negative mg/dL    POC Nitrites Negative Negative    POC Leukocyte Esterase Negative Negative   GRP B STREP, BY PCR (VALADEZ BROTH)    Collection Time: 03/21/18 10:00 AM   Result Value Ref Range    Strep Gp B DNA PCR POSITIVE (A) Negative   POC UA    Collection Time: 03/31/18  8:17 AM    Result Value Ref Range    POC Color Yellow     POC Appearance Clear     POC Glucose Negative Negative mg/dL    POC Ketones Negative Negative mg/dL    POC Specific Gravity 1.015 1.005 - 1.030    POC Blood Trace-intact (A) Negative    POC Urine PH 6.0 5.0 - 8.0    POC Protein Negative Negative mg/dL    POC Nitrites Negative Negative    POC Leukocyte Esterase Negative Negative   URINE DRUG SCREEN    Collection Time: 03/31/18  8:45 AM   Result Value Ref Range    Amphetamines Urine Negative Negative    Barbiturates Negative Negative    Benzodiazepines Negative Negative    Cocaine Metabolite Negative Negative    Methadone Negative Negative    Opiates Negative Negative    Oxycodone Negative Negative    Phencyclidine -Pcp Negative Negative    Propoxyphene Negative Negative    Cannabinoid Metab Negative Negative   Hold Blood Bank Specimen (Not Tested)    Collection Time: 03/31/18  9:30 AM   Result Value Ref Range    Holding Tube - Bb DONE    CBC with Differential    Collection Time: 03/31/18  9:30 AM   Result Value Ref Range    WBC 9.1 4.8 - 10.8 K/uL    RBC 4.11 (L) 4.20 - 5.40 M/uL    Hemoglobin 12.8 12.0 - 16.0 g/dL    Hematocrit 37.9 37.0 - 47.0 %    MCV 92.2 81.4 - 97.8 fL    MCH 31.1 27.0 - 33.0 pg    MCHC 33.8 33.6 - 35.0 g/dL    RDW 46.2 35.9 - 50.0 fL    Platelet Count 121 (L) 164 - 446 K/uL    MPV 12.2 9.0 - 12.9 fL    Neutrophils-Polys 68.40 44.00 - 72.00 %    Lymphocytes 24.80 22.00 - 41.00 %    Monocytes 5.00 0.00 - 13.40 %    Eosinophils 0.70 0.00 - 6.90 %    Basophils 0.30 0.00 - 1.80 %    Immature Granulocytes 0.80 0.00 - 0.90 %    Nucleated RBC 0.00 /100 WBC    Neutrophils (Absolute) 6.24 2.00 - 7.15 K/uL    Lymphs (Absolute) 2.26 1.00 - 4.80 K/uL    Monos (Absolute) 0.46 0.00 - 0.85 K/uL    Eos (Absolute) 0.06 0.00 - 0.51 K/uL    Baso (Absolute) 0.03 0.00 - 0.12 K/uL    Immature Granulocytes (abs) 0.07 0.00 - 0.11 K/uL    NRBC (Absolute) 0.00 K/uL   CBC without differential    Collection Time: 04/01/18   3:54 AM   Result Value Ref Range    WBC 10.3 4.8 - 10.8 K/uL    RBC 3.59 (L) 4.20 - 5.40 M/uL    Hemoglobin 11.0 (L) 12.0 - 16.0 g/dL    Hematocrit 33.3 (L) 37.0 - 47.0 %    MCV 92.8 81.4 - 97.8 fL    MCH 30.6 27.0 - 33.0 pg    MCHC 33.0 (L) 33.6 - 35.0 g/dL    RDW 46.5 35.9 - 50.0 fL    Platelet Count 111 (L) 164 - 446 K/uL    MPV 12.9 9.0 - 12.9 fL       PLAN:  Continue any current medications.  (See Med List for details.)  Return to clinic  : in 3 week(s).

## 2018-11-29 ENCOUNTER — APPOINTMENT (OUTPATIENT)
Dept: RADIOLOGY | Facility: MEDICAL CENTER | Age: 33
End: 2018-11-29
Attending: EMERGENCY MEDICINE
Payer: MEDICAID

## 2018-11-29 ENCOUNTER — HOSPITAL ENCOUNTER (EMERGENCY)
Facility: MEDICAL CENTER | Age: 33
End: 2018-11-29
Attending: EMERGENCY MEDICINE
Payer: MEDICAID

## 2018-11-29 VITALS
TEMPERATURE: 97.5 F | OXYGEN SATURATION: 98 % | DIASTOLIC BLOOD PRESSURE: 77 MMHG | WEIGHT: 174.82 LBS | SYSTOLIC BLOOD PRESSURE: 134 MMHG | HEART RATE: 78 BPM | BODY MASS INDEX: 33.01 KG/M2 | RESPIRATION RATE: 17 BRPM | HEIGHT: 61 IN

## 2018-11-29 DIAGNOSIS — R10.9 ACUTE ABDOMINAL PAIN: ICD-10-CM

## 2018-11-29 LAB
ALBUMIN SERPL BCP-MCNC: 3.8 G/DL (ref 3.2–4.9)
ALBUMIN/GLOB SERPL: 1.5 G/DL
ALP SERPL-CCNC: 60 U/L (ref 30–99)
ALT SERPL-CCNC: 12 U/L (ref 2–50)
ANION GAP SERPL CALC-SCNC: 7 MMOL/L (ref 0–11.9)
APPEARANCE UR: CLEAR
AST SERPL-CCNC: 17 U/L (ref 12–45)
BASOPHILS # BLD AUTO: 0.6 % (ref 0–1.8)
BASOPHILS # BLD: 0.04 K/UL (ref 0–0.12)
BILIRUB SERPL-MCNC: 0.3 MG/DL (ref 0.1–1.5)
BILIRUB UR QL STRIP.AUTO: NEGATIVE
BUN SERPL-MCNC: 13 MG/DL (ref 8–22)
CALCIUM SERPL-MCNC: 8.5 MG/DL (ref 8.5–10.5)
CHLORIDE SERPL-SCNC: 109 MMOL/L (ref 96–112)
CO2 SERPL-SCNC: 25 MMOL/L (ref 20–33)
COLOR UR: YELLOW
CREAT SERPL-MCNC: 0.93 MG/DL (ref 0.5–1.4)
EOSINOPHIL # BLD AUTO: 0.09 K/UL (ref 0–0.51)
EOSINOPHIL NFR BLD: 1.4 % (ref 0–6.9)
ERYTHROCYTE [DISTWIDTH] IN BLOOD BY AUTOMATED COUNT: 46.7 FL (ref 35.9–50)
GLOBULIN SER CALC-MCNC: 2.6 G/DL (ref 1.9–3.5)
GLUCOSE SERPL-MCNC: 74 MG/DL (ref 65–99)
GLUCOSE UR STRIP.AUTO-MCNC: NEGATIVE MG/DL
HCG SERPL QL: NEGATIVE
HCT VFR BLD AUTO: 38.6 % (ref 37–47)
HGB BLD-MCNC: 13 G/DL (ref 12–16)
IMM GRANULOCYTES # BLD AUTO: 0.02 K/UL (ref 0–0.11)
IMM GRANULOCYTES NFR BLD AUTO: 0.3 % (ref 0–0.9)
KETONES UR STRIP.AUTO-MCNC: NEGATIVE MG/DL
LEUKOCYTE ESTERASE UR QL STRIP.AUTO: NEGATIVE
LIPASE SERPL-CCNC: 24 U/L (ref 11–82)
LYMPHOCYTES # BLD AUTO: 2.34 K/UL (ref 1–4.8)
LYMPHOCYTES NFR BLD: 36.6 % (ref 22–41)
MCH RBC QN AUTO: 32 PG (ref 27–33)
MCHC RBC AUTO-ENTMCNC: 33.7 G/DL (ref 33.6–35)
MCV RBC AUTO: 95.1 FL (ref 81.4–97.8)
MICRO URNS: NORMAL
MONOCYTES # BLD AUTO: 0.36 K/UL (ref 0–0.85)
MONOCYTES NFR BLD AUTO: 5.6 % (ref 0–13.4)
NEUTROPHILS # BLD AUTO: 3.54 K/UL (ref 2–7.15)
NEUTROPHILS NFR BLD: 55.5 % (ref 44–72)
NITRITE UR QL STRIP.AUTO: NEGATIVE
NRBC # BLD AUTO: 0 K/UL
NRBC BLD-RTO: 0 /100 WBC
PH UR STRIP.AUTO: 6.5 [PH]
PLATELET # BLD AUTO: 227 K/UL (ref 164–446)
PMV BLD AUTO: 11 FL (ref 9–12.9)
POTASSIUM SERPL-SCNC: 4.1 MMOL/L (ref 3.6–5.5)
PROT SERPL-MCNC: 6.4 G/DL (ref 6–8.2)
PROT UR QL STRIP: NEGATIVE MG/DL
RBC # BLD AUTO: 4.06 M/UL (ref 4.2–5.4)
RBC UR QL AUTO: NEGATIVE
SODIUM SERPL-SCNC: 141 MMOL/L (ref 135–145)
SP GR UR STRIP.AUTO: 1
UROBILINOGEN UR STRIP.AUTO-MCNC: 0.2 MG/DL
WBC # BLD AUTO: 6.4 K/UL (ref 4.8–10.8)

## 2018-11-29 PROCEDURE — 80053 COMPREHEN METABOLIC PANEL: CPT

## 2018-11-29 PROCEDURE — 96374 THER/PROPH/DIAG INJ IV PUSH: CPT

## 2018-11-29 PROCEDURE — 99285 EMERGENCY DEPT VISIT HI MDM: CPT

## 2018-11-29 PROCEDURE — 81003 URINALYSIS AUTO W/O SCOPE: CPT

## 2018-11-29 PROCEDURE — 700111 HCHG RX REV CODE 636 W/ 250 OVERRIDE (IP): Performed by: EMERGENCY MEDICINE

## 2018-11-29 PROCEDURE — 76856 US EXAM PELVIC COMPLETE: CPT

## 2018-11-29 PROCEDURE — 96375 TX/PRO/DX INJ NEW DRUG ADDON: CPT

## 2018-11-29 PROCEDURE — 85025 COMPLETE CBC W/AUTO DIFF WBC: CPT

## 2018-11-29 PROCEDURE — 84703 CHORIONIC GONADOTROPIN ASSAY: CPT

## 2018-11-29 PROCEDURE — 83690 ASSAY OF LIPASE: CPT

## 2018-11-29 RX ORDER — KETOROLAC TROMETHAMINE 30 MG/ML
15 INJECTION, SOLUTION INTRAMUSCULAR; INTRAVENOUS ONCE
Status: COMPLETED | OUTPATIENT
Start: 2018-11-29 | End: 2018-11-29

## 2018-11-29 RX ORDER — HYDROCODONE BITARTRATE AND ACETAMINOPHEN 5; 325 MG/1; MG/1
1-2 TABLET ORAL EVERY 4 HOURS PRN
Qty: 20 TAB | Refills: 0 | Status: SHIPPED | OUTPATIENT
Start: 2018-11-29 | End: 2018-12-02

## 2018-11-29 RX ORDER — HYDROCODONE BITARTRATE AND ACETAMINOPHEN 5; 325 MG/1; MG/1
1-2 TABLET ORAL EVERY 6 HOURS PRN
Qty: 20 TAB | Refills: 0 | Status: SHIPPED | OUTPATIENT
Start: 2018-11-29 | End: 2018-12-02

## 2018-11-29 RX ORDER — MORPHINE SULFATE 10 MG/ML
4 INJECTION, SOLUTION INTRAMUSCULAR; INTRAVENOUS ONCE
Status: COMPLETED | OUTPATIENT
Start: 2018-11-29 | End: 2018-11-29

## 2018-11-29 RX ADMIN — MORPHINE SULFATE 4 MG: 10 INJECTION INTRAVENOUS at 17:13

## 2018-11-29 RX ADMIN — KETOROLAC TROMETHAMINE 15 MG: 30 INJECTION, SOLUTION INTRAMUSCULAR at 15:05

## 2018-11-29 ASSESSMENT — PAIN SCALES - GENERAL
PAINLEVEL_OUTOF10: 3
PAINLEVEL_OUTOF10: 7
PAINLEVEL_OUTOF10: 10

## 2018-11-29 NOTE — ED NOTES
..Pt updated on poc. Expresses no needs at this time. Pt has call light in hand and verbalizes understanding of use. Pt nad. Will continue to monitor

## 2018-11-29 NOTE — ED PROVIDER NOTES
"ED Provider Note    Scribed for Elmer Subramanian M.D. by Juan Henao. 11/29/2018, 2:39 PM.    Means of arrival: Walk-in  History obtained from: Patient  History limited by: None    CHIEF COMPLAINT  Chief Complaint   Patient presents with   • Abdominal Pain     Pt reports abd/pelvic pain for yrs/ more severe the past 3 wks/ today pt reports \"can't stand it\"/ pt denies bleeding, reports was going to get an U/S on Sat.    • Pelvic Pain       HPI  Alyse Fernandez is a 33 y.o. female who presents to the Emergency Department complaining of lower abdominal pain. The pain began about five years ago. The pain is intermittent. She has never been evaluated by a physician for her pain. Her pain is exacerbated by sexual intercourse. The patient reports associated dysuria.  She denies fever. She became established with a Gynecologist last week.  She has never been diagnosed with an ovarian cyst or cancer. She has also been experiencing drainage from her nipples and is scheduled for a mammogram.     REVIEW OF SYSTEMS  Pertinent positives include drainage from nipples, abdominal pain and dysuria. Pertinent negatives include no fever. As above, all other systems reviewed and are negative.   See HPI for further details.     PAST MEDICAL HISTORY   has a past medical history of Depression (2006).    SURGICAL HISTORY   has a past surgical history that includes appendectomy; abdominal exploration (1997); dilation and curettage; tube & ectopic preg., removal (2004); primary c section (2004,2011); and repeat c section w tubal ligation (N/A, 3/31/2018).    SOCIAL HISTORY  Social History   Substance Use Topics   • Smoking status: Former Smoker     Types: Cigarettes   • Smokeless tobacco: Current User      Comment: Pt states smoking about 3 day   • Alcohol use No      History   Drug Use No     Comment: Pt states had substance abuse in the past for meth       FAMILY HISTORY  Family History   Problem Relation Age of Onset   • " "Cancer Mother        CURRENT MEDICATIONS  No current facility-administered medications on file prior to encounter.      Current Outpatient Prescriptions on File Prior to Encounter   Medication Sig Dispense Refill   • ibuprofen (MOTRIN) 800 MG Tab Take 1 Tab by mouth every 8 hours as needed for Moderate Pain. 30 Tab 3   • ibuprofen (MOTRIN) 600 MG Tab Take 1 Tab by mouth every 6 hours as needed (For cramping after delivery; do not give if patient is receiving ketorolac (Toradol)). 30 Tab 0   • Prenatal MV-Min-Fe Fum-FA-DHA (PRENATAL 1 PO) Take  by mouth.         ALLERGIES  No Known Allergies    PHYSICAL EXAM  VITAL SIGNS: /77   Pulse 78   Temp 36.4 °C (97.5 °F) (Temporal)   Resp 18   Ht 1.549 m (5' 1\")   Wt 79.3 kg (174 lb 13.2 oz)   SpO2 97%   BMI 33.03 kg/m²   Vitals reviewed.  Constitutional: Alert. Appears uncomfortable  HENT: No signs of trauma, Bilateral external ears normal, Nose normal.   Eyes: Pupils are equal and reactive, Conjunctiva normal, Non-icteric.   Neck: Normal range of motion, No tenderness, Supple, No stridor.   Lymphatic: No lymphadenopathy noted.   Cardiovascular: Regular rate and rhythm, no murmurs.   Thorax & Lungs: Normal breath sounds, No respiratory distress, No wheezing, No chest tenderness.   Abdomen: Bowel sounds normal, Soft, Diffuse lower abdominal tenderness, No peritoneal signs, No masses, No pulsatile masses.   Skin: Warm, Dry, No erythema, No rash.   Back: No bony tenderness, No CVA tenderness.   Extremities: Intact distal pulses, No edema, No tenderness, No cyanosis  Musculoskeletal: Good range of motion in all major joints. No tenderness to palpation or major deformities noted.   Neurologic: Alert , Normal motor function, Normal sensory function, No focal deficits noted.   Psychiatric: Affect normal, Judgment normal, Mood normal.     DIAGNOSTIC STUDIES / PROCEDURES    LABS  Labs Reviewed   CBC WITH DIFFERENTIAL - Abnormal; Notable for the following:        Result " Value    RBC 4.06 (*)     All other components within normal limits   COMP METABOLIC PANEL   LIPASE   HCG QUAL SERUM   URINALYSIS,CULTURE IF INDICATED   ESTIMATED GFR          All labs reviewed by me.        RADIOLOGY  US-PELVIC COMPLETE (TRANSABDOMINAL/TRANSVAGINAL) (COMBO)   Final Result      Unremarkable transvaginal appearance of the pelvis.        The radiologist's interpretation of all radiological studies have been reviewed by me.    COURSE & MEDICAL DECISION MAKING  Nursing notes, VS, PMSFHx reviewed in chart.  Differential diagnoses include but not limited to: Endometriosis, ovarian cyst, UTI, abdominal pain of unknown cause       2:39 PM Patient seen and examined at bedside. Ordered for US pelvic transvaginal only, CBC with differential, CMP, Lipase, HCG qual serum, and U/A culture if indicated to evaluate. Patient will be treated with ketorolac injection 15 mg for her symptoms.      5:32 PM the patient's labs and ultrasound are unremarkable.  She was still having pain after Toradol and she was given 4 mg IV morphine.  Patient reports she has a gynecologist that she will follow-up with.  I gave her a copy of her ultrasound results.  She also will follow-up at the imaging center for breast mass.  The patient possibly has endometriosis.    I reviewed prescription monitoring program for patient's narcotic use before prescribing a scheduled drug.The patient will not drink alcohol nor drive with prescribed medications. The patient will return for new or worsening symptoms and is stable at the time of discharge.    The patient is referred to her primary physician for blood pressure management, diabetic screening, and for all other preventative health concerns.    In prescribing controlled substances to this patient, I certify that I have obtained and reviewed the medical history of Alyse Fernandez. I have also made a good taz effort to obtain applicable records from other providers who have treated the  patient and records did not demonstrate any increased risk of substance abuse that would prevent me from prescribing controlled substances.     I have conducted a physical exam and documented it. I have reviewed Ms. Fernandez’s prescription history as maintained by the [place] Prescription Monitoring Program.     I have assessed the patient’s risk for abuse, dependency, and addiction using the validated Opioid Risk Tool available at https://www.mdcalc.com/zyidiu-jvir-jsip-ort-narcotic-abuse.     Given the above, I believe the benefits of controlled substance therapy outweigh the risks. The reasons for prescribing controlled substances include non-narcotic, oral analgesic alternatives have been inadequate for pain control. Accordingly, I have discussed the risk and benefits, treatment plan, and alternative therapies with the patient.         DISPOSITION:  Patient will be discharged home in stable condition.    FOLLOW UP:  [facility], Emergency Dept  [facility] [facility] [address] [phone]    If symptoms worsen          follow up with your OB GYN      OUTPATIENT MEDICATIONS:  New Prescriptions    HYDROCODONE-ACETAMINOPHEN (NORCO) 5-325 MG TAB PER TABLET    Take 1-2 Tabs by mouth every 6 hours as needed for up to 3 days.         FINAL IMPRESSION  1. Acute abdominal pain          Juan BUTT (Halieibe), am scribing for, and in the presence of, Elmer Subramanian M.D..    Electronically signed by: Juan Henao (Halieibe), 11/29/2018    Elmer BUTT M.D. personally performed the services described in this documentation, as scribed by Juan Henao in my presence, and it is both accurate and complete. C    The note accurately reflects work and decisions made by me.  Elmer Subramanian  11/29/2018  5:33 PM

## 2018-11-29 NOTE — ED TRIAGE NOTES
"Chief Complaint   Patient presents with   • Abdominal Pain     Pt reports abd/pelvic pain for yrs/ more severe the past 3 wks/ today pt reports \"can't stand it\"/ pt denies bleeding, reports was going to get an U/S on Sat.    • Pelvic Pain     Pt given urine collection supplies/instruction.   Protocol ordered.  Explained to pt triage process, made pt aware to tell this RN/staff of any changes/concerns, pt verbalized understanding of process and instructions given. Pt to ER lobby.    "

## 2018-11-30 NOTE — DISCHARGE INSTRUCTIONS
Abdominal Pain, Women  Abdominal (stomach, pelvic, or belly) pain can be caused by many things. It is important to tell your doctor:  · The location of the pain.  · Does it come and go or is it present all the time?  · Are there things that start the pain (eating certain foods, exercise)?  · Are there other symptoms associated with the pain (fever, nausea, vomiting, diarrhea)?  All of this is helpful to know when trying to find the cause of the pain.  CAUSES   · Stomach: virus or bacteria infection, or ulcer.  · Intestine: appendicitis (inflamed appendix), regional ileitis (Crohn's disease), ulcerative colitis (inflamed colon), irritable bowel syndrome, diverticulitis (inflamed diverticulum of the colon), or cancer of the stomach or intestine.  · Gallbladder disease or stones in the gallbladder.  · Kidney disease, kidney stones, or infection.  · Pancreas infection or cancer.  · Fibromyalgia (pain disorder).  · Diseases of the female organs:  · Uterus: fibroid (non-cancerous) tumors or infection.  · Fallopian tubes: infection or tubal pregnancy.  · Ovary: cysts or tumors.  · Pelvic adhesions (scar tissue).  · Endometriosis (uterus lining tissue growing in the pelvis and on the pelvic organs).  · Pelvic congestion syndrome (female organs filling up with blood just before the menstrual period).  · Pain with the menstrual period.  · Pain with ovulation (producing an egg).  · Pain with an IUD (intrauterine device, birth control) in the uterus.  · Cancer of the female organs.  · Functional pain (pain not caused by a disease, may improve without treatment).  · Psychological pain.  · Depression.  DIAGNOSIS   Your doctor will decide the seriousness of your pain by doing an examination.  · Blood tests.  · X-rays.  · Ultrasound.  · CT scan (computed tomography, special type of X-ray).  · MRI (magnetic resonance imaging).  · Cultures, for infection.  · Barium enema (dye inserted in the large intestine, to better view it with  X-rays).  · Colonoscopy (looking in intestine with a lighted tube).  · Laparoscopy (minor surgery, looking in abdomen with a lighted tube).  · Major abdominal exploratory surgery (looking in abdomen with a large incision).  TREATMENT   The treatment will depend on the cause of the pain.   · Many cases can be observed and treated at home.  · Over-the-counter medicines recommended by your caregiver.  · Prescription medicine.  · Antibiotics, for infection.  · Birth control pills, for painful periods or for ovulation pain.  · Hormone treatment, for endometriosis.  · Nerve blocking injections.  · Physical therapy.  · Antidepressants.  · Counseling with a psychologist or psychiatrist.  · Minor or major surgery.  HOME CARE INSTRUCTIONS   · Do not take laxatives, unless directed by your caregiver.  · Take over-the-counter pain medicine only if ordered by your caregiver. Do not take aspirin because it can cause an upset stomach or bleeding.  · Try a clear liquid diet (broth or water) as ordered by your caregiver. Slowly move to a bland diet, as tolerated, if the pain is related to the stomach or intestine.  · Have a thermometer and take your temperature several times a day, and record it.  · Bed rest and sleep, if it helps the pain.  · Avoid sexual intercourse, if it causes pain.  · Avoid stressful situations.  · Keep your follow-up appointments and tests, as your caregiver orders.  · If the pain does not go away with medicine or surgery, you may try:  · Acupuncture.  · Relaxation exercises (yoga, meditation).  · Group therapy.  · Counseling.  SEEK MEDICAL CARE IF:   · You notice certain foods cause stomach pain.  · Your home care treatment is not helping your pain.  · You need stronger pain medicine.  · You want your IUD removed.  · You feel faint or lightheaded.  · You develop nausea and vomiting.  · You develop a rash.  · You are having side effects or an allergy to your medicine.  SEEK IMMEDIATE MEDICAL CARE IF:   · Your  pain does not go away or gets worse.  · You have a fever.  · Your pain is felt only in portions of the abdomen. The right side could possibly be appendicitis. The left lower portion of the abdomen could be colitis or diverticulitis.  · You are passing blood in your stools (bright red or black tarry stools, with or without vomiting).  · You have blood in your urine.  · You develop chills, with or without a fever.  · You pass out.  MAKE SURE YOU:   · Understand these instructions.  · Will watch your condition.  · Will get help right away if you are not doing well or get worse.  Document Released: 10/14/2008 Document Revised: 03/11/2013 Document Reviewed: 11/04/2010  [email or web address]® Patient Information ©2014 [email or web address], [employer].

## 2018-11-30 NOTE — ED PROVIDER NOTES
ED Provider Note    :Addendum: I have rewritten prescription prescribed by my partner Dr. Martinez for [place] for this patient.  He had inadvertently not sign the prescription prior to leaving.  I have written the same prescription I do note he is documented reviewing the  report and were reviewed his chart recording indications for narcotics.  I prescribed the 20 Norco 5/325 prescribed by him

## 2018-11-30 NOTE — ED NOTES
..Pt verbalizes understanding of dc instructions. Rx given. Pt states will not drink/drive on rx of narcotics provided. Pt states all questions have been answered and they feel comfortable with dc instructions provided. Pt states has all personal belonging. Pt to lobby w/o incident

## 2019-05-20 ENCOUNTER — TELEPHONE (OUTPATIENT)
Dept: HEMATOLOGY ONCOLOGY | Facility: MEDICAL CENTER | Age: 34
End: 2019-05-20

## 2019-05-20 NOTE — TELEPHONE ENCOUNTER
1st attempt to contact the patient- Unable to leave voicemail (phone temporarily not in service) for patient requesting a return call to schedule New Oncology Genetic appointment. (Remind patient to bring a list/ or questionnaire, of her cancer related family history)  NP/Dung medicaid/ Polycystic ovarian syndrome, Fam hx breast ca/Kasie Tatum- NOTIFY OF $120 AND NON-COVERAGE FORM

## 2019-05-29 NOTE — TELEPHONE ENCOUNTER
2nd attempt to contact the patient- Unable to leave voicemail (phone temporarily not in service) for patient requesting a return call to schedule New Oncology Genetic appointment. (Remind patient to bring a list/ or questionnaire, of her cancer related family history)  NP/Dung medicaid/ Polycystic ovarian syndrome, Fam hx breast ca/Kasie Tatum- NOTIFY OF $120 AND NON-COVERAGE FORM

## 2019-05-31 NOTE — TELEPHONE ENCOUNTER
3rd attempt to contact the patient- Unable to leave voicemail (phone temporarily not in service) mailed a letter to the patient requesting a return call to schedule New Oncology Genetic appointment. (Remind patient to bring a list/ or questionnaire, of her cancer related family history)  NP/Dung medicaid/ Polycystic ovarian syndrome, Fam hx breast ca/Kasie Tatum- NOTIFY OF $120 AND NON-COVERAGE FORM

## 2019-06-26 PROCEDURE — 99358 PROLONG SERVICE W/O CONTACT: CPT | Performed by: MEDICAL GENETICS

## 2019-06-26 NOTE — PROGRESS NOTES
"  Non face to face prolonged services of clinical data and chart information reviewed for a total time of 30 minutes performed on 6/26 for Alyse Fernandez    Reviewed were:    Referral    Previous encounters    Medications    Imaging    Previous procedures    Other Orders    Letters    Notes    Media    Miscellaneous reports    Patient summaries        Counseling, testing information and materials prepared    \"I spent 30 minutes  from 0705 to 0735 reviewing past records, prior to visit pertaining to genetic risk.\"     Jose Hernandez M.D.    "

## 2019-06-27 ENCOUNTER — OFFICE VISIT (OUTPATIENT)
Dept: HEMATOLOGY ONCOLOGY | Facility: MEDICAL CENTER | Age: 34
End: 2019-06-27

## 2019-06-27 VITALS
OXYGEN SATURATION: 94 % | RESPIRATION RATE: 18 BRPM | TEMPERATURE: 98.7 F | HEART RATE: 66 BPM | HEIGHT: 61 IN | SYSTOLIC BLOOD PRESSURE: 112 MMHG | BODY MASS INDEX: 35.8 KG/M2 | DIASTOLIC BLOOD PRESSURE: 80 MMHG | WEIGHT: 189.6 LBS

## 2019-06-27 DIAGNOSIS — Z15.02 BRCA2 GENE MUTATION POSITIVE IN FEMALE: ICD-10-CM

## 2019-06-27 DIAGNOSIS — Z15.09 BRCA2 GENE MUTATION POSITIVE IN FEMALE: ICD-10-CM

## 2019-06-27 DIAGNOSIS — Z15.01 BRCA2 GENE MUTATION POSITIVE IN FEMALE: ICD-10-CM

## 2019-06-27 DIAGNOSIS — Z80.3 FAMILY HISTORY OF BREAST CANCER: ICD-10-CM

## 2019-06-27 PROCEDURE — 99202 OFFICE O/P NEW SF 15 MIN: CPT | Performed by: MEDICAL GENETICS

## 2019-06-27 ASSESSMENT — PAIN SCALES - GENERAL: PAINLEVEL: NO PAIN

## 2019-06-27 NOTE — PROGRESS NOTES
ESTABLISHED PATIENT RETURN VISIT     Alyse Fernandez was originally referred by John and seen on 6/27 for family history of cancer and BRCA2 pathogenic variant test     Molecular Genetic testing revealed molecular pathology: BRCA2 ([id number])     The patient returns for follow up genetic/genomic counseling, management initiation, referral to appropriate providers and family testing.     Molecular Results discussion    Increased risk breast cancer (57-87% lifetime risk), increased risk 2nd primary/recurrance    Increased risk ovarian cancer (44-50% lifetime)    Increased risk pancreatic cancer    Increased risk prostate cancer    Increased risk male breast cancer   Management discussion and health implications    As above   Referral providers recommended and discussed    RECOMMEND     Referral to breast health specialist (Dr Kati Gomes or Dr Jaki Figueroa) to initiate MRI surveillance and to discuss risk reducing bilateral mastectomy     Referral to gyn oncology (Dr Lino Adame) to discuss surveillance and risk reducing bilateral salpingo-oophorectomy         Family testing was initiated through Family Testing Program    Alyse has 4 children each with 50% risk of having inherited BRCA2 variant and should be tested at age 18-21 or when judged emotionally able to handle results    Alyse has a sister (with 5 children) and a half brother (with daughter) who should be tested for familial variant     PE   Vital signs:  As indicated in Epic data base     Targeted Physical exam                Summary      Patient with BRCA2 pathogenic variant     Cancer predispositional risks reviewed, surveillance and management choices discussed at length    All questions answered     Plan    Referral to breast (Henry Gomes) and ovarian ([facility]) health specialists    I spent a total of 30 minutes of face to face time with >50% of time spent in counseling and coordination of care discussing matters stated in above  note.    Jose Hernandez M.D.

## 2020-01-19 ENCOUNTER — HOSPITAL ENCOUNTER (EMERGENCY)
Facility: MEDICAL CENTER | Age: 35
End: 2020-01-19
Attending: EMERGENCY MEDICINE
Payer: MEDICAID

## 2020-01-19 VITALS
DIASTOLIC BLOOD PRESSURE: 80 MMHG | SYSTOLIC BLOOD PRESSURE: 133 MMHG | TEMPERATURE: 99.9 F | BODY MASS INDEX: 33.16 KG/M2 | RESPIRATION RATE: 16 BRPM | HEART RATE: 80 BPM | OXYGEN SATURATION: 96 % | WEIGHT: 175.49 LBS

## 2020-01-19 DIAGNOSIS — M54.50 ACUTE RIGHT-SIDED LOW BACK PAIN WITHOUT SCIATICA: ICD-10-CM

## 2020-01-19 PROCEDURE — 99284 EMERGENCY DEPT VISIT MOD MDM: CPT

## 2020-01-19 PROCEDURE — 96372 THER/PROPH/DIAG INJ SC/IM: CPT

## 2020-01-19 PROCEDURE — 700111 HCHG RX REV CODE 636 W/ 250 OVERRIDE (IP): Performed by: EMERGENCY MEDICINE

## 2020-01-19 RX ORDER — DIAZEPAM 5 MG/ML
2.5 INJECTION, SOLUTION INTRAMUSCULAR; INTRAVENOUS ONCE
Status: COMPLETED | OUTPATIENT
Start: 2020-01-19 | End: 2020-01-19

## 2020-01-19 RX ORDER — KETOROLAC TROMETHAMINE 30 MG/ML
15 INJECTION, SOLUTION INTRAMUSCULAR; INTRAVENOUS ONCE
Status: COMPLETED | OUTPATIENT
Start: 2020-01-19 | End: 2020-01-19

## 2020-01-19 RX ORDER — NAPROXEN 500 MG/1
500 TABLET ORAL 2 TIMES DAILY WITH MEALS
Qty: 30 TAB | Refills: 0 | Status: SHIPPED | OUTPATIENT
Start: 2020-01-19 | End: 2020-05-10

## 2020-01-19 RX ORDER — CYCLOBENZAPRINE HCL 10 MG
10 TABLET ORAL 3 TIMES DAILY PRN
Qty: 30 TAB | Refills: 0 | Status: SHIPPED | OUTPATIENT
Start: 2020-01-19 | End: 2020-05-10

## 2020-01-19 RX ADMIN — DIAZEPAM 2.5 MG: 5 INJECTION, SOLUTION INTRAMUSCULAR; INTRAVENOUS at 17:11

## 2020-01-19 RX ADMIN — KETOROLAC TROMETHAMINE 15 MG: 30 INJECTION, SOLUTION INTRAMUSCULAR at 17:11

## 2020-01-19 ASSESSMENT — LIFESTYLE VARIABLES: DO YOU DRINK ALCOHOL: NO

## 2020-01-20 NOTE — ED NOTES
Patient given dc papers, instructed to pick Rx up at as called in pharmacy ([facility]) states she will head over there asap when leaves ED. Also instruted to call PCP asap for follow up care /neuro surg consult for chronic back pain. Vss. Steady gate noted, able to walk unassisted. Care transferred to self care at this time
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
## 2020-05-10 ENCOUNTER — HOSPITAL ENCOUNTER (EMERGENCY)
Facility: MEDICAL CENTER | Age: 35
End: 2020-05-11
Attending: EMERGENCY MEDICINE
Payer: MEDICAID

## 2020-05-10 ENCOUNTER — APPOINTMENT (OUTPATIENT)
Dept: RADIOLOGY | Facility: MEDICAL CENTER | Age: 35
End: 2020-05-10
Attending: EMERGENCY MEDICINE
Payer: MEDICAID

## 2020-05-10 DIAGNOSIS — F41.9 ANXIETY: ICD-10-CM

## 2020-05-10 LAB
ALBUMIN SERPL BCP-MCNC: 3.8 G/DL (ref 3.2–4.9)
ALBUMIN/GLOB SERPL: 1.8 G/DL
ALP SERPL-CCNC: 61 U/L (ref 30–99)
ALT SERPL-CCNC: 18 U/L (ref 2–50)
ANION GAP SERPL CALC-SCNC: 12 MMOL/L (ref 7–16)
AST SERPL-CCNC: 22 U/L (ref 12–45)
BASOPHILS # BLD AUTO: 0.6 % (ref 0–1.8)
BASOPHILS # BLD: 0.04 K/UL (ref 0–0.12)
BILIRUB SERPL-MCNC: 0.2 MG/DL (ref 0.1–1.5)
BUN SERPL-MCNC: 13 MG/DL (ref 8–22)
CALCIUM SERPL-MCNC: 8.1 MG/DL (ref 8.5–10.5)
CHLORIDE SERPL-SCNC: 110 MMOL/L (ref 96–112)
CO2 SERPL-SCNC: 20 MMOL/L (ref 20–33)
CREAT SERPL-MCNC: 0.76 MG/DL (ref 0.5–1.4)
EOSINOPHIL # BLD AUTO: 0.01 K/UL (ref 0–0.51)
EOSINOPHIL NFR BLD: 0.2 % (ref 0–6.9)
ERYTHROCYTE [DISTWIDTH] IN BLOOD BY AUTOMATED COUNT: 51 FL (ref 35.9–50)
GLOBULIN SER CALC-MCNC: 2.1 G/DL (ref 1.9–3.5)
GLUCOSE SERPL-MCNC: 105 MG/DL (ref 65–99)
HCG SERPL QL: NEGATIVE
HCT VFR BLD AUTO: 44.6 % (ref 37–47)
HGB BLD-MCNC: 15 G/DL (ref 12–16)
IMM GRANULOCYTES # BLD AUTO: 0.02 K/UL (ref 0–0.11)
IMM GRANULOCYTES NFR BLD AUTO: 0.3 % (ref 0–0.9)
LYMPHOCYTES # BLD AUTO: 2.11 K/UL (ref 1–4.8)
LYMPHOCYTES NFR BLD: 32.9 % (ref 22–41)
MCH RBC QN AUTO: 33.6 PG (ref 27–33)
MCHC RBC AUTO-ENTMCNC: 33.6 G/DL (ref 33.6–35)
MCV RBC AUTO: 99.8 FL (ref 81.4–97.8)
MONOCYTES # BLD AUTO: 0.33 K/UL (ref 0–0.85)
MONOCYTES NFR BLD AUTO: 5.1 % (ref 0–13.4)
NEUTROPHILS # BLD AUTO: 3.9 K/UL (ref 2–7.15)
NEUTROPHILS NFR BLD: 60.9 % (ref 44–72)
NRBC # BLD AUTO: 0 K/UL
NRBC BLD-RTO: 0 /100 WBC
PLATELET # BLD AUTO: 218 K/UL (ref 164–446)
PMV BLD AUTO: 10.9 FL (ref 9–12.9)
POTASSIUM SERPL-SCNC: 4.2 MMOL/L (ref 3.6–5.5)
PROT SERPL-MCNC: 5.9 G/DL (ref 6–8.2)
RBC # BLD AUTO: 4.47 M/UL (ref 4.2–5.4)
SODIUM SERPL-SCNC: 142 MMOL/L (ref 135–145)
WBC # BLD AUTO: 6.4 K/UL (ref 4.8–10.8)

## 2020-05-10 PROCEDURE — 84703 CHORIONIC GONADOTROPIN ASSAY: CPT

## 2020-05-10 PROCEDURE — 85025 COMPLETE CBC W/AUTO DIFF WBC: CPT

## 2020-05-10 PROCEDURE — 84443 ASSAY THYROID STIM HORMONE: CPT

## 2020-05-10 PROCEDURE — 84439 ASSAY OF FREE THYROXINE: CPT

## 2020-05-10 PROCEDURE — 71045 X-RAY EXAM CHEST 1 VIEW: CPT

## 2020-05-10 PROCEDURE — 80053 COMPREHEN METABOLIC PANEL: CPT

## 2020-05-10 PROCEDURE — 99283 EMERGENCY DEPT VISIT LOW MDM: CPT

## 2020-05-10 SDOH — HEALTH STABILITY: MENTAL HEALTH: HOW OFTEN DO YOU HAVE A DRINK CONTAINING ALCOHOL?: 4 OR MORE TIMES A WEEK

## 2020-05-10 ASSESSMENT — LIFESTYLE VARIABLES
CONSUMPTION TOTAL: INCOMPLETE
TOTAL SCORE: 0
DOES PATIENT WANT TO STOP DRINKING: NO
TOTAL SCORE: 0
TOTAL SCORE: 0
HAVE YOU EVER FELT YOU SHOULD CUT DOWN ON YOUR DRINKING: NO
HAVE PEOPLE ANNOYED YOU BY CRITICIZING YOUR DRINKING: NO
EVER HAD A DRINK FIRST THING IN THE MORNING TO STEADY YOUR NERVES TO GET RID OF A HANGOVER: NO
DO YOU DRINK ALCOHOL: NO
EVER FELT BAD OR GUILTY ABOUT YOUR DRINKING: NO

## 2020-05-10 ASSESSMENT — FIBROSIS 4 INDEX: FIB4 SCORE: 0.74

## 2020-05-11 VITALS
HEART RATE: 88 BPM | BODY MASS INDEX: 28.15 KG/M2 | WEIGHT: 164.9 LBS | OXYGEN SATURATION: 97 % | SYSTOLIC BLOOD PRESSURE: 128 MMHG | RESPIRATION RATE: 18 BRPM | TEMPERATURE: 98.3 F | DIASTOLIC BLOOD PRESSURE: 73 MMHG | HEIGHT: 64 IN

## 2020-05-11 LAB
T4 FREE SERPL-MCNC: 0.99 NG/DL (ref 0.93–1.7)
TSH SERPL DL<=0.005 MIU/L-ACNC: 2.59 UIU/ML (ref 0.38–5.33)

## 2020-05-11 RX ORDER — HYDROXYZINE HYDROCHLORIDE 25 MG/1
25 TABLET, FILM COATED ORAL 3 TIMES DAILY PRN
Qty: 20 TAB | Refills: 0 | Status: SHIPPED | OUTPATIENT
Start: 2020-05-11 | End: 2021-02-27

## 2020-05-11 RX ORDER — HYDROXYZINE HYDROCHLORIDE 25 MG/1
25 TABLET, FILM COATED ORAL 3 TIMES DAILY PRN
Qty: 30 TAB | Refills: 0 | Status: SHIPPED | OUTPATIENT
Start: 2020-05-11 | End: 2020-05-11 | Stop reason: SDUPTHER

## 2020-05-11 NOTE — ED TRIAGE NOTES
Alyse Gaspardith  34 y.o. female  Chief Complaint   Patient presents with   • Anxiety     Pt presents to the ED with worsening anxiety symtpoms. Pt states she has been dealing with alot of personal termoil at home for several weeks and states she feels like she is going to break. Pt denies any hx of anxiety or current medications.        Pt amb to triage with steady gait for above complaint. Pt admits to a pint of vodka as well as marijuana use today.   Pt is alert and oriented, speaking in full sentences, follows commands and responds appropriately to questions. Resp are even and unlabored. No behavioral indicators of pain.   Pt placed in lobby. Pt educated on triage process. Pt encouraged to alert staff for any changes.

## 2020-05-11 NOTE — DISCHARGE PLANNING
Alert Team:    Requested by ERP to provide resource list for patient to follow up for outpatient mental health. Due to patient's [place] Medicaid coverage, discussed [facility], [facility] and [facility] with patient who is familiar with organization. Urged patient to call or go to [facility] drop-in healthcare resource Center tomorrow when it opens at 9 am for psychiatric evaluation and initiate therapy. Patient vocalized understanding and reiterated that she is not suicidal but experiencing an increase in anxiety.

## 2020-05-11 NOTE — ED PROVIDER NOTES
ED Provider Note    CHIEF COMPLAINT  Chief Complaint   Patient presents with   • Anxiety     Pt presents to the ED with worsening anxiety symtpoms. Pt states she has been dealing with alot of personal termoil at home for several weeks and states she feels like she is going to break. Pt denies any hx of anxiety or current medications.        HPI  Alyse Fernandez is a 34 y.o. female who presents with worsening anxiety symptoms.  States that she had a panic attack today.  Has a lot of stressors at home for the past several weeks.  She is not currently followed by a therapist.  Denies any routine psychiatric medications.  Has seen a therapist in the past however has not been unable to see 1 during the corona virus outbreak.  No active suicidal or homicidal ideation.  No recent drug abuse.  Drinks about 2 alcoholic drinks a day.  Does not appear to be intoxicated currently.  She states that with the anxiety, she has some pressure in her mid chest and trouble breathing.  No vomiting.  No fevers.  No cough.  No abdominal pain.  No active chest pain currently.  She continues to feel anxious however.    REVIEW OF SYSTEMS  See HPI for further details. All other systems are negative.     PAST MEDICAL HISTORY   has a past medical history of Depression (2006).    SOCIAL HISTORY  Social History     Tobacco Use   • Smoking status: Current Every Day Smoker     Packs/day: 0.25     Types: Cigarettes   • Smokeless tobacco: Current User   • Tobacco comment: Pt states smoking about 3 day   Substance and Sexual Activity   • Alcohol use: Yes     Frequency: 4 or more times a week   • Drug use: Yes     Types: Methamphetamines, Inhaled     Comment: THC   • Sexual activity: Yes     Partners: Male     Birth control/protection: Condom       SURGICAL HISTORY   has a past surgical history that includes appendectomy; abdominal exploration (1997); dilation and curettage; tube & ectopic preg., removal (2004); primary c section (2004,2011);  "and repeat c section w tubal ligation (N/A, 3/31/2018).    CURRENT MEDICATIONS  Home Medications     Reviewed by Christel Flores R.N. (Registered Nurse) on 05/10/20 at 2146  Med List Status: Complete   Medication Last Dose Status        Patient Ric Taking any Medications                       ALLERGIES  No Known Allergies    PHYSICAL EXAM  VITAL SIGNS: /95   Pulse (!) 110   Temp 36.4 °C (97.5 °F) (Temporal)   Resp 19   Ht 1.626 m (5' 4\")   Wt 74.8 kg (164 lb 14.5 oz)   SpO2 96%   BMI 28.31 kg/m²   Pulse ox interpretation: I interpret this pulse ox as normal.  Constitutional: Alert in no apparent distress.  HENT: No signs of trauma, Bilateral external ears normal, Nose normal.   Eyes: Pupils are equal and reactive, Conjunctiva normal, Non-icteric.   Neck: Normal range of motion, No tenderness, Supple, No stridor.   Cardiovascular: Regular rate and rhythm.   Thorax & Lungs: Normal breath sounds, No respiratory distress, No wheezing, No chest tenderness.   Abdomen: Bowel sounds normal, Soft, No tenderness, No masses, No pulsatile masses. No peritoneal signs.  Skin: Warm, Dry, No erythema, No rash.   Extremities: Intact distal pulses, No edema, No tenderness, No cyanosis  Neurologic: Alert, Normal motor function and gait, Normal sensory function, No focal deficits noted.   Psychiatric: No active suicidal or homicidal ideation, Judgment normal, Mood depressed.       DIAGNOSTIC STUDIES / PROCEDURES    EKG - Physician interpretation  No results found for this or any previous visit.      LABS  Labs Reviewed   CBC WITH DIFFERENTIAL - Abnormal; Notable for the following components:       Result Value    MCV 99.8 (*)     MCH 33.6 (*)     RDW 51.0 (*)     All other components within normal limits   COMP METABOLIC PANEL - Abnormal; Notable for the following components:    Glucose 105 (*)     Calcium 8.1 (*)     Total Protein 5.9 (*)     All other components within normal limits   TSH   HCG QUAL SERUM   FREE " "THYROXINE   ESTIMATED GFR         RADIOLOGY  DX-CHEST-PORTABLE (1 VIEW)   Final Result      No acute cardiac or pulmonary abnormalities are identified.            COURSE & MEDICAL DECISION MAKING    Medications - No data to display    Pertinent Labs & Imaging studies reviewed. (See chart for details)  34 y.o. female presenting with \"anxiety\".  Has multiple life stressors.  Has subjective shortness of breath symptoms and chest discomfort however no active symptoms currently and she is resting comfortably.  Slightly tachycardic and not anxious upon arrival.  This did improve upon reevaluation.  No, nausea, vomiting, abdominal pain.  No recent illness or fevers.  No known sick contacts.    I contacted life skills to help this patient with obtaining outpatient psychiatric services.  She does not appear to meet criteria for legal hold.    In order to help the patient's symptoms intermittently until she can follow-up with a primary care physician, she was prescribed Atarax to be used as needed, 3 times a day.  Laboratory studies are unremarkable.  No significant anemia or significant metabolic abnormality.  Thyroid function is normal.  The patient not pregnant.    The patient was instructed to follow-up with primary care physician for further management.  To return immediately for any worsening symptoms or development of any other concerning signs or symptoms. The patient verbalizes understanding in their own words.    /73   Pulse 88   Temp 36.8 °C (98.3 °F) (Oral)   Resp 18   Ht 1.626 m (5' 4\")   Wt 74.8 kg (164 lb 14.5 oz)   SpO2 97%   BMI 28.31 kg/m²     The patient was referred to primary care where they will receive further BP management.      [facility], Emergency Dept  [address] [facility] [address] [phone]    As needed, If symptoms worsen    Primary care doctor    Schedule an appointment as soon as possible for a visit         FINAL IMPRESSION  1. Anxiety      "       Electronically signed by: Mike Ramos M.D., 5/10/2020 10:06 PM

## 2020-05-13 ENCOUNTER — APPOINTMENT (OUTPATIENT)
Dept: RADIOLOGY | Facility: MEDICAL CENTER | Age: 35
End: 2020-05-13
Attending: EMERGENCY MEDICINE
Payer: MEDICAID

## 2020-05-13 ENCOUNTER — HOSPITAL ENCOUNTER (EMERGENCY)
Facility: MEDICAL CENTER | Age: 35
End: 2020-05-13
Attending: EMERGENCY MEDICINE
Payer: MEDICAID

## 2020-05-13 VITALS
HEART RATE: 75 BPM | OXYGEN SATURATION: 95 % | WEIGHT: 164 LBS | TEMPERATURE: 98.1 F | DIASTOLIC BLOOD PRESSURE: 70 MMHG | RESPIRATION RATE: 16 BRPM | BODY MASS INDEX: 28.15 KG/M2 | SYSTOLIC BLOOD PRESSURE: 121 MMHG

## 2020-05-13 DIAGNOSIS — S20.211A CONTUSION OF RIB ON RIGHT SIDE, INITIAL ENCOUNTER: ICD-10-CM

## 2020-05-13 DIAGNOSIS — S09.90XA CLOSED HEAD INJURY, INITIAL ENCOUNTER: ICD-10-CM

## 2020-05-13 PROCEDURE — 70450 CT HEAD/BRAIN W/O DYE: CPT

## 2020-05-13 PROCEDURE — 99284 EMERGENCY DEPT VISIT MOD MDM: CPT

## 2020-05-13 PROCEDURE — 700111 HCHG RX REV CODE 636 W/ 250 OVERRIDE (IP): Performed by: EMERGENCY MEDICINE

## 2020-05-13 PROCEDURE — 96374 THER/PROPH/DIAG INJ IV PUSH: CPT

## 2020-05-13 PROCEDURE — 71101 X-RAY EXAM UNILAT RIBS/CHEST: CPT | Mod: RT

## 2020-05-13 PROCEDURE — 700101 HCHG RX REV CODE 250: Performed by: EMERGENCY MEDICINE

## 2020-05-13 PROCEDURE — 73090 X-RAY EXAM OF FOREARM: CPT | Mod: LT

## 2020-05-13 RX ORDER — LIDOCAINE 50 MG/G
1 PATCH TOPICAL EVERY 24 HOURS
Qty: 10 PATCH | Refills: 0 | Status: SHIPPED | OUTPATIENT
Start: 2020-05-13

## 2020-05-13 RX ORDER — LIDOCAINE 50 MG/G
1 PATCH TOPICAL ONCE
Status: DISCONTINUED | OUTPATIENT
Start: 2020-05-13 | End: 2020-05-13 | Stop reason: HOSPADM

## 2020-05-13 RX ORDER — OXYCODONE HYDROCHLORIDE AND ACETAMINOPHEN 5; 325 MG/1; MG/1
1 TABLET ORAL ONCE
Status: DISCONTINUED | OUTPATIENT
Start: 2020-05-13 | End: 2020-05-13

## 2020-05-13 RX ORDER — KETOROLAC TROMETHAMINE 30 MG/ML
30 INJECTION, SOLUTION INTRAMUSCULAR; INTRAVENOUS ONCE
Status: COMPLETED | OUTPATIENT
Start: 2020-05-13 | End: 2020-05-13

## 2020-05-13 RX ADMIN — LIDOCAINE 1 PATCH: 50 PATCH CUTANEOUS at 20:45

## 2020-05-13 RX ADMIN — KETOROLAC TROMETHAMINE 30 MG: 30 INJECTION, SOLUTION INTRAMUSCULAR at 19:06

## 2020-05-13 ASSESSMENT — FIBROSIS 4 INDEX: FIB4 SCORE: 0.81
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
## 2020-05-14 NOTE — ED NOTES
Pt unable to ambulate due to rib pain per pt.  Pt assisted to bathroom via wheelchair and RN assistance.  ERP updated.
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
## 2020-05-14 NOTE — ED PROVIDER NOTES
ED PROVIDER NOTE    Scribed for Christiano Duvall M.D. by Juan Henao. 5/13/2020, 6:46 PM.    This is an addendum to the note on Alyse Fernandez. For further details and full chart entry, see the previously signed ED Provider Note written by Dr. Middleton (ERP).  I was asked to become involved in the case when the patient could not walk due to chest wall pain when she was otherwise ready for discharge.    6:46 PM - The patient is complaining of chest wall pain and left forearm pain. Ordered ketorolac 30 mg to treat and left forearm x-ray to evaluate. Dr. Michele, ERP, is aware of the patient's case and discussed it with Dr. Middleton and he will assume care.     I, Juan Henao (Scribe), am scribing for, and in the presence of, Christiano Duvall M.D..    Electronically signed by: Juan Henao (Scribe), 5/13/2020    IChristiano M.D. personally performed the services described in this documentation, as scribed by Juan Henao in my presence, and it is both accurate and complete.    The note accurately reflects work and decisions made by me.  Christiano Duvall M.D.  5/14/2020  7:35 AM

## 2020-05-14 NOTE — ED NOTES
Pt ambulated to bathroom hunched over in pain, taking small steps and frequent breaks.  Pt reports pain is in her right rib cage area and left forearm.  YAMILE Duvall evaluated pt at bedside.

## 2021-02-14 ENCOUNTER — HOSPITAL ENCOUNTER (EMERGENCY)
Facility: MEDICAL CENTER | Age: 36
End: 2021-02-14
Attending: EMERGENCY MEDICINE
Payer: MEDICAID

## 2021-02-14 ENCOUNTER — APPOINTMENT (OUTPATIENT)
Dept: RADIOLOGY | Facility: MEDICAL CENTER | Age: 36
End: 2021-02-14
Attending: EMERGENCY MEDICINE
Payer: MEDICAID

## 2021-02-14 VITALS
HEIGHT: 62 IN | SYSTOLIC BLOOD PRESSURE: 127 MMHG | DIASTOLIC BLOOD PRESSURE: 71 MMHG | WEIGHT: 141 LBS | RESPIRATION RATE: 28 BRPM | BODY MASS INDEX: 25.95 KG/M2 | OXYGEN SATURATION: 98 % | HEART RATE: 68 BPM | TEMPERATURE: 97.2 F

## 2021-02-14 DIAGNOSIS — R11.2 NON-INTRACTABLE VOMITING WITH NAUSEA, UNSPECIFIED VOMITING TYPE: ICD-10-CM

## 2021-02-14 DIAGNOSIS — B34.9 VIRAL SYNDROME: ICD-10-CM

## 2021-02-14 LAB
ALBUMIN SERPL BCP-MCNC: 4.3 G/DL (ref 3.2–4.9)
ALBUMIN/GLOB SERPL: 1.6 G/DL
ALP SERPL-CCNC: 83 U/L (ref 30–99)
ALT SERPL-CCNC: 18 U/L (ref 2–50)
ANION GAP SERPL CALC-SCNC: 12 MMOL/L (ref 7–16)
AST SERPL-CCNC: 25 U/L (ref 12–45)
BASOPHILS # BLD AUTO: 0.5 % (ref 0–1.8)
BASOPHILS # BLD: 0.02 K/UL (ref 0–0.12)
BILIRUB SERPL-MCNC: 0.4 MG/DL (ref 0.1–1.5)
BUN SERPL-MCNC: 7 MG/DL (ref 8–22)
CALCIUM SERPL-MCNC: 8.9 MG/DL (ref 8.5–10.5)
CHLORIDE SERPL-SCNC: 106 MMOL/L (ref 96–112)
CO2 SERPL-SCNC: 22 MMOL/L (ref 20–33)
CREAT SERPL-MCNC: 0.63 MG/DL (ref 0.5–1.4)
EKG IMPRESSION: NORMAL
EOSINOPHIL # BLD AUTO: 0.03 K/UL (ref 0–0.51)
EOSINOPHIL NFR BLD: 0.7 % (ref 0–6.9)
ERYTHROCYTE [DISTWIDTH] IN BLOOD BY AUTOMATED COUNT: 49.1 FL (ref 35.9–50)
GLOBULIN SER CALC-MCNC: 2.7 G/DL (ref 1.9–3.5)
GLUCOSE SERPL-MCNC: 78 MG/DL (ref 65–99)
HCT VFR BLD AUTO: 45.2 % (ref 37–47)
HGB BLD-MCNC: 14.9 G/DL (ref 12–16)
IMM GRANULOCYTES # BLD AUTO: 0.01 K/UL (ref 0–0.11)
IMM GRANULOCYTES NFR BLD AUTO: 0.2 % (ref 0–0.9)
LYMPHOCYTES # BLD AUTO: 1.57 K/UL (ref 1–4.8)
LYMPHOCYTES NFR BLD: 37.1 % (ref 22–41)
MCH RBC QN AUTO: 33.3 PG (ref 27–33)
MCHC RBC AUTO-ENTMCNC: 33 G/DL (ref 33.6–35)
MCV RBC AUTO: 101.1 FL (ref 81.4–97.8)
MONOCYTES # BLD AUTO: 0.36 K/UL (ref 0–0.85)
MONOCYTES NFR BLD AUTO: 8.5 % (ref 0–13.4)
NEUTROPHILS # BLD AUTO: 2.24 K/UL (ref 2–7.15)
NEUTROPHILS NFR BLD: 53 % (ref 44–72)
NRBC # BLD AUTO: 0 K/UL
NRBC BLD-RTO: 0 /100 WBC
PLATELET # BLD AUTO: 181 K/UL (ref 164–446)
PMV BLD AUTO: 11.3 FL (ref 9–12.9)
POTASSIUM SERPL-SCNC: 3.8 MMOL/L (ref 3.6–5.5)
PROT SERPL-MCNC: 7 G/DL (ref 6–8.2)
RBC # BLD AUTO: 4.47 M/UL (ref 4.2–5.4)
SODIUM SERPL-SCNC: 140 MMOL/L (ref 135–145)
TROPONIN T SERPL-MCNC: 7 NG/L (ref 6–19)
WBC # BLD AUTO: 4.2 K/UL (ref 4.8–10.8)

## 2021-02-14 PROCEDURE — 93005 ELECTROCARDIOGRAM TRACING: CPT | Performed by: EMERGENCY MEDICINE

## 2021-02-14 PROCEDURE — U0003 INFECTIOUS AGENT DETECTION BY NUCLEIC ACID (DNA OR RNA); SEVERE ACUTE RESPIRATORY SYNDROME CORONAVIRUS 2 (SARS-COV-2) (CORONAVIRUS DISEASE [COVID-19]), AMPLIFIED PROBE TECHNIQUE, MAKING USE OF HIGH THROUGHPUT TECHNOLOGIES AS DESCRIBED BY CMS-2020-01-R: HCPCS

## 2021-02-14 PROCEDURE — 99285 EMERGENCY DEPT VISIT HI MDM: CPT

## 2021-02-14 PROCEDURE — 71045 X-RAY EXAM CHEST 1 VIEW: CPT

## 2021-02-14 PROCEDURE — U0005 INFEC AGEN DETEC AMPLI PROBE: HCPCS

## 2021-02-14 PROCEDURE — 700111 HCHG RX REV CODE 636 W/ 250 OVERRIDE (IP): Performed by: EMERGENCY MEDICINE

## 2021-02-14 PROCEDURE — 84484 ASSAY OF TROPONIN QUANT: CPT

## 2021-02-14 PROCEDURE — 80053 COMPREHEN METABOLIC PANEL: CPT

## 2021-02-14 PROCEDURE — 96374 THER/PROPH/DIAG INJ IV PUSH: CPT

## 2021-02-14 PROCEDURE — 36415 COLL VENOUS BLD VENIPUNCTURE: CPT

## 2021-02-14 PROCEDURE — 85025 COMPLETE CBC W/AUTO DIFF WBC: CPT

## 2021-02-14 RX ORDER — ONDANSETRON 4 MG/1
4 TABLET, ORALLY DISINTEGRATING ORAL EVERY 6 HOURS PRN
Qty: 10 TABLET | Refills: 0 | Status: SHIPPED | OUTPATIENT
Start: 2021-02-14

## 2021-02-14 RX ORDER — ONDANSETRON 2 MG/ML
4 INJECTION INTRAMUSCULAR; INTRAVENOUS ONCE
Status: COMPLETED | OUTPATIENT
Start: 2021-02-14 | End: 2021-02-14

## 2021-02-14 RX ADMIN — ONDANSETRON 4 MG: 2 INJECTION INTRAMUSCULAR; INTRAVENOUS at 13:54

## 2021-02-14 ASSESSMENT — FIBROSIS 4 INDEX: FIB4 SCORE: 0.83

## 2021-02-14 NOTE — DISCHARGE INSTRUCTIONS
You have been tested for COVID-19 today.  Your results are pending.  Please act as if these results are positive and self isolate until you receive the results.  Make sure you still wear a mask, social distance and practice good hand hygiene no matter the result.  In order to receive the results you will need to log into your [facility] on the [employer] website.  If you do not have an account you can create an account.  You can login or create an account for my chart at [email or web address].  If your symptoms worsen to a point that you become so short of breath you can only walk a very short distances prior to fatigue or feel you were unable to manage your symptoms at home, please return to the emergency department.  For a more objective approach you can buy a pulse oximeter online.  [employer] has multiple to choose from.  If your oxygen saturation in these devices is persistently below 90% please return to the ER.

## 2021-02-14 NOTE — ED TRIAGE NOTES
Chief Complaint   Patient presents with   • Cough     2-3 weeks. coughing up yellowish w/brown streaks.    • Generalized Body Aches     x 1 week   • Chest Pain     x 1 week describes as pressure     Educated on triage process. Instructed to notify staff for any worsening symptoms. Placed in senior lounge room.

## 2021-02-14 NOTE — ED NOTES
Pt discharged home per provider in stable condition. Paperwork provided to pt via RN and discharge instructions went over with by RN - pt verbalized understanding of teaching with no questions or concerns and is A/Ox4, VSS. Paperwork in hand on discharge.    Pt verbalized understanding of teaching to go home and act as if they were positive. RN went over mychart page and pt verbalized understanding and indicated will register. Pt is ambulatory on discharge. Paperwork in hand. No questions or concerns.

## 2021-02-15 LAB
SARS-COV-2 RNA RESP QL NAA+PROBE: NOTDETECTED
SPECIMEN SOURCE: NORMAL
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
## 2021-02-27 ENCOUNTER — HOSPITAL ENCOUNTER (EMERGENCY)
Facility: MEDICAL CENTER | Age: 36
End: 2021-02-27
Attending: EMERGENCY MEDICINE
Payer: MEDICAID

## 2021-02-27 VITALS
OXYGEN SATURATION: 98 % | HEIGHT: 61 IN | SYSTOLIC BLOOD PRESSURE: 142 MMHG | WEIGHT: 144.84 LBS | HEART RATE: 84 BPM | DIASTOLIC BLOOD PRESSURE: 82 MMHG | RESPIRATION RATE: 18 BRPM | TEMPERATURE: 97.3 F | BODY MASS INDEX: 27.35 KG/M2

## 2021-02-27 DIAGNOSIS — R11.2 NAUSEA VOMITING AND DIARRHEA: ICD-10-CM

## 2021-02-27 DIAGNOSIS — B34.9 VIRAL SYNDROME: ICD-10-CM

## 2021-02-27 DIAGNOSIS — Z20.822 EXPOSURE TO COVID-19 VIRUS: ICD-10-CM

## 2021-02-27 DIAGNOSIS — R19.7 NAUSEA VOMITING AND DIARRHEA: ICD-10-CM

## 2021-02-27 LAB
SARS-COV-2 RNA RESP QL NAA+PROBE: NOTDETECTED
SPECIMEN SOURCE: NORMAL

## 2021-02-27 PROCEDURE — 700111 HCHG RX REV CODE 636 W/ 250 OVERRIDE (IP): Performed by: EMERGENCY MEDICINE

## 2021-02-27 PROCEDURE — 99283 EMERGENCY DEPT VISIT LOW MDM: CPT

## 2021-02-27 PROCEDURE — A9270 NON-COVERED ITEM OR SERVICE: HCPCS | Performed by: EMERGENCY MEDICINE

## 2021-02-27 PROCEDURE — 700102 HCHG RX REV CODE 250 W/ 637 OVERRIDE(OP): Performed by: EMERGENCY MEDICINE

## 2021-02-27 PROCEDURE — U0005 INFEC AGEN DETEC AMPLI PROBE: HCPCS

## 2021-02-27 PROCEDURE — U0003 INFECTIOUS AGENT DETECTION BY NUCLEIC ACID (DNA OR RNA); SEVERE ACUTE RESPIRATORY SYNDROME CORONAVIRUS 2 (SARS-COV-2) (CORONAVIRUS DISEASE [COVID-19]), AMPLIFIED PROBE TECHNIQUE, MAKING USE OF HIGH THROUGHPUT TECHNOLOGIES AS DESCRIBED BY CMS-2020-01-R: HCPCS

## 2021-02-27 RX ORDER — ONDANSETRON 4 MG/1
4 TABLET, ORALLY DISINTEGRATING ORAL EVERY 8 HOURS PRN
Qty: 10 TABLET | Refills: 0 | Status: SHIPPED | OUTPATIENT
Start: 2021-02-27

## 2021-02-27 RX ORDER — IBUPROFEN 600 MG/1
600 TABLET ORAL ONCE
Status: COMPLETED | OUTPATIENT
Start: 2021-02-27 | End: 2021-02-27

## 2021-02-27 RX ORDER — ACETAMINOPHEN 325 MG/1
650 TABLET ORAL ONCE
Status: COMPLETED | OUTPATIENT
Start: 2021-02-27 | End: 2021-02-27

## 2021-02-27 RX ORDER — ONDANSETRON 4 MG/1
4 TABLET, ORALLY DISINTEGRATING ORAL ONCE
Status: COMPLETED | OUTPATIENT
Start: 2021-02-27 | End: 2021-02-27

## 2021-02-27 RX ADMIN — ONDANSETRON 4 MG: 4 TABLET, ORALLY DISINTEGRATING ORAL at 05:58

## 2021-02-27 RX ADMIN — ACETAMINOPHEN 650 MG: 325 TABLET, FILM COATED ORAL at 05:59

## 2021-02-27 RX ADMIN — IBUPROFEN 600 MG: 600 TABLET, FILM COATED ORAL at 05:58

## 2021-02-27 ASSESSMENT — FIBROSIS 4 INDEX: FIB4 SCORE: 1.14

## 2021-02-27 NOTE — ED NOTES
Patient changed into gown and placed on monitor.Updated patient on plan of care, verbalized understanding. Patient wearing mask on arrival, droplet precautions sign in place. Patient medicated for pain and nausea. Given crackers and educated to wait 5 minutes before attempting PO challenge

## 2021-02-27 NOTE — ED TRIAGE NOTES
"Chief Complaint   Patient presents with   • Nausea/Vomiting/Diarrhea   • Body Aches     Patient ambulatory to triage. Patient states that she has been having covid like symptoms for several weeks. She says that her boyfriend has the same symptoms and tested positive for covid, where this patient tested negative at the same time. Patient reports no improvement. /76   Pulse 84   Temp 36.3 °C (97.3 °F) (Temporal)   Resp 18   Ht 1.549 m (5' 1\")   Wt 65.7 kg (144 lb 13.5 oz)   LMP 02/27/2021   SpO2 98%   BMI 27.37 kg/m²     "

## 2021-02-27 NOTE — ED NOTES
Patient educated on discharge instructions, prescriptions, and home care. Patient verbalized understanding. Patient ambulated to [facility].

## 2021-02-27 NOTE — ED PROVIDER NOTES
ED Provider Note    Scribed for Roberth Kirkpatrick M.D. by Mike Mukherjee. 2/27/2021  5:48 AM    Primary care provider: Pcp Pt States None  Means of arrival: walk-in  History obtained from: patient   History limited by: none    CHIEF COMPLAINT  Chief Complaint   Patient presents with    Nausea/Vomiting/Diarrhea    Body Aches       HPI  Alyse Fernandez is a 35 y.o. female who presents to the Emergency Department with complaints of nausea, vomiting, and diarrhea for about the past 2-3 weeks. She states that her boyfriend tested positive for COVID-19 at the beginning of the month; she tested negative at that time. She states that she came in today for persistent vomiting. She notes that it has been intermittent, and it will occasionally wake her up in the morning. She also reports having diarrhea for the same amount of time. She describes having associated mild diffuse abdominal aching. Denies any chance of pregnancy due to prior tubal ligation. Denies any fever or chest pain.    REVIEW OF SYSTEMS  Pertinent positives include nausea, vomiting, diarrhea, abdominal pain.   Pertinent negatives include no fever or chest pain.  All other systems reviewed and negative. See HPI for further details.       PAST MEDICAL HISTORY   has a past medical history of Depression (2006).    SURGICAL HISTORY   has a past surgical history that includes appendectomy; abdominal exploration (1997); dilation and curettage; tube & ectopic preg., removal (2004); primary c section (2004,2011); and repeat c section w tubal ligation (N/A, 3/31/2018).    SOCIAL HISTORY  Social History     Tobacco Use    Smoking status: Current Every Day Smoker     Packs/day: 0.25     Types: Cigarettes    Smokeless tobacco: Never Used    Tobacco comment: Pt states smoking about 3 day   Substance Use Topics    Alcohol use: Yes     Comment: occ    Drug use: Yes     Types: Methamphetamines, Inhaled     Comment: THC      Social History     Substance and Sexual  "Activity   Drug Use Yes    Types: Methamphetamines, Inhaled    Comment: THC       FAMILY HISTORY  Family History   Problem Relation Age of Onset    Cancer Mother        CURRENT MEDICATIONS  Home Medications       Reviewed by Aida Hoff R.N. (Registered Nurse) on 02/27/21 at 0502  Med List Status: Partial     Medication Last Dose Status   lidocaine (LIDODERM) 5 % Patch  Active   ondansetron (ZOFRAN ODT) 4 MG TABLET DISPERSIBLE  Active                    ALLERGIES  No Known Allergies    PHYSICAL EXAM  VITAL SIGNS: /76   Pulse 84   Temp 36.3 °C (97.3 °F) (Temporal)   Resp 18   Ht 1.549 m (5' 1\")   Wt 65.7 kg (144 lb 13.5 oz)   LMP 02/27/2021   SpO2 98%   BMI 27.37 kg/m²     Nursing note and vitals reviewed.  Constitutional: Well-developed and well-nourished. No distress.   HENT: Head is normocephalic and atraumatic. Oropharynx is clear and moist without exudate or erythema.   Eyes: Pupils are equal, round, and reactive to light. Conjunctiva are normal.   Cardiovascular: Normal rate and regular rhythm. No murmur heard. Normal radial pulses.  Pulmonary/Chest: Breath sounds normal. No wheezes or rales.   Abdominal: Soft and no distention. Unable to illicit any abdominal tenderness.   Musculoskeletal: Extremities exhibit normal range of motion without edema or tenderness.   Neurological: Awake, alert and oriented to person, place, and time. No focal deficits noted.  Skin: Skin is warm and dry. No rash.   Psychiatric: Normal mood and affect. Appropriate for clinical situation.    DIAGNOSTIC STUDIES / PROCEDURES    LABS  Results for orders placed or performed during the hospital encounter of 02/27/21   SARS-CoV-2 PCR (24 hour In-House): Collect NP swab in VTM    Specimen: Respirate   Result Value Ref Range    SARS-CoV-2 Source NP Swab       All labs reviewed by me.    COURSE & MEDICAL DECISION MAKING  Nursing notes, VS, PMSFHx reviewed in chart.     5:48 AM - Patient seen and examined at bedside. Patient " will be treated with Zofran 4 mg, Motrin 600 mg, and Tylenol 650 mg.  Ordered COVID testing to evaluate her symptoms. The differential diagnoses include but are not limited to: COVID, viral syndrome.     6:52 AM - Patient tolerated PO fluids well. She overall reports having improved symptoms. Advised Tylenol/ibuprofen for symptomatic management. Drink plenty of fluids. Patient provided with a prescription for Zofran. ED return precautions were discussed. Patient understands where to find her COVID-19 results. Patient verbalizes understanding and agreement to this plan of care.      HTN/IDDM FOLLOW UP:  The patient is referred to a primary physician for blood pressure management, diabetic screening, and for all other preventive health concerns    The patient will return for new or worsening symptoms and is stable at the time of discharge.    DISPOSITION:  Patient will be discharged home in stable condition.    FOLLOW UP:  [facility], Emergency Dept  [address] [facility] [id number] [phone]    If symptoms worsen      OUTPATIENT MEDICATIONS:  New Prescriptions    ONDANSETRON (ZOFRAN ODT) 4 MG TABLET DISPERSIBLE    Take 1 tablet by mouth every 8 hours as needed.        FINAL IMPRESSION  1. Nausea vomiting and diarrhea    2. Viral syndrome    3. Exposure to COVID-19 virus          Mike BUTT (Halieibe), am scribing for, and in the presence of, Roberth Kirkpatrick M.D..    Electronically signed by: Mike Mukherjee (Scribe), 2/27/2021    Roberth BUTT M.D. personally performed the services described in this documentation, as scribed by Mike Mukherjee in my presence, and it is both accurate and complete.    The note accurately reflects work and decisions made by me.  Roberth Kirkpatrick M.D.  2/27/2021  10:30 AM

## 2024-02-19 ENCOUNTER — APPOINTMENT (OUTPATIENT)
Dept: RADIOLOGY | Facility: MEDICAL CENTER | Age: 39
End: 2024-02-19
Attending: EMERGENCY MEDICINE
Payer: OTHER MISCELLANEOUS

## 2024-02-19 ENCOUNTER — PHARMACY VISIT (OUTPATIENT)
Dept: PHARMACY | Facility: MEDICAL CENTER | Age: 39
End: 2024-02-19
Payer: MEDICARE

## 2024-02-19 ENCOUNTER — HOSPITAL ENCOUNTER (EMERGENCY)
Facility: MEDICAL CENTER | Age: 39
End: 2024-02-19
Attending: EMERGENCY MEDICINE
Payer: OTHER MISCELLANEOUS

## 2024-02-19 VITALS
WEIGHT: 159.17 LBS | RESPIRATION RATE: 14 BRPM | SYSTOLIC BLOOD PRESSURE: 145 MMHG | OXYGEN SATURATION: 95 % | BODY MASS INDEX: 30.05 KG/M2 | HEIGHT: 61 IN | TEMPERATURE: 97 F | DIASTOLIC BLOOD PRESSURE: 88 MMHG | HEART RATE: 76 BPM

## 2024-02-19 DIAGNOSIS — S39.012A STRAIN OF LUMBAR REGION, INITIAL ENCOUNTER: ICD-10-CM

## 2024-02-19 DIAGNOSIS — S80.02XA CONTUSION OF LEFT KNEE, INITIAL ENCOUNTER: ICD-10-CM

## 2024-02-19 DIAGNOSIS — S60.221A CONTUSION OF RIGHT HAND, INITIAL ENCOUNTER: ICD-10-CM

## 2024-02-19 PROCEDURE — 73562 X-RAY EXAM OF KNEE 3: CPT | Mod: LT

## 2024-02-19 PROCEDURE — 72100 X-RAY EXAM L-S SPINE 2/3 VWS: CPT

## 2024-02-19 PROCEDURE — RXMED WILLOW AMBULATORY MEDICATION CHARGE: Performed by: EMERGENCY MEDICINE

## 2024-02-19 PROCEDURE — 73130 X-RAY EXAM OF HAND: CPT | Mod: RT

## 2024-02-19 PROCEDURE — 99283 EMERGENCY DEPT VISIT LOW MDM: CPT

## 2024-02-19 PROCEDURE — 700111 HCHG RX REV CODE 636 W/ 250 OVERRIDE (IP): Performed by: EMERGENCY MEDICINE

## 2024-02-19 PROCEDURE — 96372 THER/PROPH/DIAG INJ SC/IM: CPT

## 2024-02-19 RX ORDER — KETOROLAC TROMETHAMINE 15 MG/ML
15 INJECTION, SOLUTION INTRAMUSCULAR; INTRAVENOUS ONCE
Status: COMPLETED | OUTPATIENT
Start: 2024-02-19 | End: 2024-02-19

## 2024-02-19 RX ORDER — MELOXICAM 7.5 MG/1
7.5 TABLET ORAL DAILY
Qty: 30 TABLET | Refills: 0 | Status: SHIPPED | OUTPATIENT
Start: 2024-02-19

## 2024-02-19 RX ORDER — CYCLOBENZAPRINE HCL 10 MG
10 TABLET ORAL 3 TIMES DAILY PRN
Qty: 30 TABLET | Refills: 0 | Status: SHIPPED | OUTPATIENT
Start: 2024-02-19

## 2024-02-19 RX ADMIN — KETOROLAC TROMETHAMINE 15 MG: 15 INJECTION, SOLUTION INTRAMUSCULAR; INTRAVENOUS at 07:16

## 2024-02-19 ASSESSMENT — PAIN DESCRIPTION - PAIN TYPE: TYPE: ACUTE PAIN

## 2024-02-19 NOTE — ED NOTES
Bedside report received from off going RN/tech: Liz, assumed care of patient.  POC discussed with patient. Call light within reach, all needs addressed at this time.       Fall risk interventions in place: Not Applicable (all applicable per [place] Fall risk assessment)   Continuous monitoring: Pulse Ox or Blood Pressure  IVF/IV medications: Not Applicable   Oxygen: Room Air  Bedside sitter: Not Applicable   Isolation: Not Applicable

## 2024-02-19 NOTE — DISCHARGE INSTRUCTIONS
Your x-rays were very reassuring.  We have prescribed you some medicine to help with your pain and the muscle spasms.  This should improve over the next few days
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
## 2024-03-28 ENCOUNTER — APPOINTMENT (OUTPATIENT)
Dept: RADIOLOGY | Facility: MEDICAL CENTER | Age: 39
End: 2024-03-28
Attending: STUDENT IN AN ORGANIZED HEALTH CARE EDUCATION/TRAINING PROGRAM
Payer: COMMERCIAL

## 2024-03-28 ENCOUNTER — HOSPITAL ENCOUNTER (EMERGENCY)
Facility: MEDICAL CENTER | Age: 39
End: 2024-03-28
Attending: STUDENT IN AN ORGANIZED HEALTH CARE EDUCATION/TRAINING PROGRAM
Payer: COMMERCIAL

## 2024-03-28 VITALS
OXYGEN SATURATION: 90 % | SYSTOLIC BLOOD PRESSURE: 127 MMHG | HEART RATE: 83 BPM | WEIGHT: 151.9 LBS | RESPIRATION RATE: 18 BRPM | HEIGHT: 61 IN | TEMPERATURE: 98.2 F | BODY MASS INDEX: 28.68 KG/M2 | DIASTOLIC BLOOD PRESSURE: 78 MMHG

## 2024-03-28 DIAGNOSIS — R10.2 PELVIC PAIN: ICD-10-CM

## 2024-03-28 DIAGNOSIS — R03.0 ELEVATED BLOOD PRESSURE READING: ICD-10-CM

## 2024-03-28 LAB
ALBUMIN SERPL BCP-MCNC: 4.9 G/DL (ref 3.2–4.9)
ALBUMIN/GLOB SERPL: 1.8 G/DL
ALP SERPL-CCNC: 89 U/L (ref 30–99)
ALT SERPL-CCNC: 20 U/L (ref 2–50)
ANION GAP SERPL CALC-SCNC: 16 MMOL/L (ref 7–16)
APPEARANCE UR: CLEAR
AST SERPL-CCNC: 49 U/L (ref 12–45)
BASOPHILS # BLD AUTO: 0.6 % (ref 0–1.8)
BASOPHILS # BLD: 0.05 K/UL (ref 0–0.12)
BILIRUB SERPL-MCNC: 0.4 MG/DL (ref 0.1–1.5)
BILIRUB UR QL STRIP.AUTO: NEGATIVE
BUN SERPL-MCNC: 6 MG/DL (ref 8–22)
CALCIUM ALBUM COR SERPL-MCNC: 8 MG/DL (ref 8.5–10.5)
CALCIUM SERPL-MCNC: 8.7 MG/DL (ref 8.5–10.5)
CHLORIDE SERPL-SCNC: 104 MMOL/L (ref 96–112)
CO2 SERPL-SCNC: 22 MMOL/L (ref 20–33)
COLOR UR: YELLOW
CREAT SERPL-MCNC: 0.48 MG/DL (ref 0.5–1.4)
EOSINOPHIL # BLD AUTO: 0.02 K/UL (ref 0–0.51)
EOSINOPHIL NFR BLD: 0.2 % (ref 0–6.9)
ERYTHROCYTE [DISTWIDTH] IN BLOOD BY AUTOMATED COUNT: 48.7 FL (ref 35.9–50)
GFR SERPLBLD CREATININE-BSD FMLA CKD-EPI: 124 ML/MIN/1.73 M 2
GLOBULIN SER CALC-MCNC: 2.7 G/DL (ref 1.9–3.5)
GLUCOSE SERPL-MCNC: 89 MG/DL (ref 65–99)
GLUCOSE UR STRIP.AUTO-MCNC: NEGATIVE MG/DL
HCG SERPL QL: NEGATIVE
HCT VFR BLD AUTO: 43.2 % (ref 37–47)
HGB BLD-MCNC: 15.1 G/DL (ref 12–16)
IMM GRANULOCYTES # BLD AUTO: 0.02 K/UL (ref 0–0.11)
IMM GRANULOCYTES NFR BLD AUTO: 0.2 % (ref 0–0.9)
KETONES UR STRIP.AUTO-MCNC: NEGATIVE MG/DL
LEUKOCYTE ESTERASE UR QL STRIP.AUTO: NEGATIVE
LIPASE SERPL-CCNC: 35 U/L (ref 11–82)
LYMPHOCYTES # BLD AUTO: 2.69 K/UL (ref 1–4.8)
LYMPHOCYTES NFR BLD: 29.8 % (ref 22–41)
MCH RBC QN AUTO: 33.3 PG (ref 27–33)
MCHC RBC AUTO-ENTMCNC: 35 G/DL (ref 32.2–35.5)
MCV RBC AUTO: 95.4 FL (ref 81.4–97.8)
MICRO URNS: NORMAL
MONOCYTES # BLD AUTO: 0.49 K/UL (ref 0–0.85)
MONOCYTES NFR BLD AUTO: 5.4 % (ref 0–13.4)
NEUTROPHILS # BLD AUTO: 5.76 K/UL (ref 1.82–7.42)
NEUTROPHILS NFR BLD: 63.8 % (ref 44–72)
NITRITE UR QL STRIP.AUTO: NEGATIVE
NRBC # BLD AUTO: 0 K/UL
NRBC BLD-RTO: 0 /100 WBC (ref 0–0.2)
PH UR STRIP.AUTO: 5.5 [PH] (ref 5–8)
PLATELET # BLD AUTO: 263 K/UL (ref 164–446)
PMV BLD AUTO: 10.5 FL (ref 9–12.9)
POTASSIUM SERPL-SCNC: 3.9 MMOL/L (ref 3.6–5.5)
PROT SERPL-MCNC: 7.6 G/DL (ref 6–8.2)
PROT UR QL STRIP: NEGATIVE MG/DL
RBC # BLD AUTO: 4.53 M/UL (ref 4.2–5.4)
RBC UR QL AUTO: NEGATIVE
SODIUM SERPL-SCNC: 142 MMOL/L (ref 135–145)
SP GR UR STRIP.AUTO: 1.01
UROBILINOGEN UR STRIP.AUTO-MCNC: 0.2 MG/DL
WBC # BLD AUTO: 9 K/UL (ref 4.8–10.8)

## 2024-03-28 PROCEDURE — 83690 ASSAY OF LIPASE: CPT

## 2024-03-28 PROCEDURE — 81003 URINALYSIS AUTO W/O SCOPE: CPT

## 2024-03-28 PROCEDURE — A9270 NON-COVERED ITEM OR SERVICE: HCPCS | Mod: UD | Performed by: STUDENT IN AN ORGANIZED HEALTH CARE EDUCATION/TRAINING PROGRAM

## 2024-03-28 PROCEDURE — 99285 EMERGENCY DEPT VISIT HI MDM: CPT

## 2024-03-28 PROCEDURE — 84703 CHORIONIC GONADOTROPIN ASSAY: CPT

## 2024-03-28 PROCEDURE — 700102 HCHG RX REV CODE 250 W/ 637 OVERRIDE(OP): Mod: UD | Performed by: STUDENT IN AN ORGANIZED HEALTH CARE EDUCATION/TRAINING PROGRAM

## 2024-03-28 PROCEDURE — 76856 US EXAM PELVIC COMPLETE: CPT

## 2024-03-28 PROCEDURE — 36415 COLL VENOUS BLD VENIPUNCTURE: CPT

## 2024-03-28 PROCEDURE — 80053 COMPREHEN METABOLIC PANEL: CPT

## 2024-03-28 PROCEDURE — 85025 COMPLETE CBC W/AUTO DIFF WBC: CPT

## 2024-03-28 RX ORDER — HYDROCODONE BITARTRATE AND ACETAMINOPHEN 5; 325 MG/1; MG/1
1 TABLET ORAL ONCE
Status: COMPLETED | OUTPATIENT
Start: 2024-03-28 | End: 2024-03-28

## 2024-03-28 RX ORDER — IBUPROFEN 200 MG
400 TABLET ORAL
Status: COMPLETED | OUTPATIENT
Start: 2024-03-28 | End: 2024-03-28

## 2024-03-28 RX ADMIN — HYDROCODONE BITARTRATE AND ACETAMINOPHEN 1 TABLET: 5; 325 TABLET ORAL at 02:21

## 2024-03-28 RX ADMIN — IBUPROFEN 400 MG: 200 TABLET, FILM COATED ORAL at 02:21

## 2024-03-28 ASSESSMENT — PAIN DESCRIPTION - PAIN TYPE
TYPE: ACUTE PAIN
TYPE: ACUTE PAIN

## 2024-03-28 NOTE — ED TRIAGE NOTES
Chief Complaint   Patient presents with    Vaginal Pain     Since 2 days ago    Denied any vaginal bleeding, or other discharges      Abdominal Pain     Lower abdominal pain since 2 days ago    Denied any nausea/ vomiting, diarrhea, fever or chills       .Pain: 10/10    Pt came in to triage ambulatory with steady gait for the above complaints.     Pt is alert and oriented x 4, speaking in full sentences, follows commands and responds appropriately to questions.     Respirations are even and unlabored.    Pt placed in lobby. Pt educated on triage process.     Pt encouraged to inform staff for any changes in condition or if needs help while waiting to be room in.    Vitals:    03/28/24 0127   BP: (!) 143/106   Pulse: (!) 119   Resp: 20   Temp: 36.7 °C (98.1 °F)   SpO2: 97%

## 2024-03-28 NOTE — ED PROVIDER NOTES
ED Provider Note    CHIEF COMPLAINT  Chief Complaint   Patient presents with    Vaginal Pain     Since 2 days ago    Denied any vaginal bleeding, or other discharges      Abdominal Pain     Lower abdominal pain since 2 days ago    Denied any nausea/ vomiting, diarrhea, fever or chills       EXTERNAL RECORDS REVIEWED  Outpatient Notes normal pelvic ultrasound in .  Recent ED visit 2024 for back pain.  ED visit in  for nausea vomiting and diarrhea.    HPI/ROS  LIMITATION TO HISTORY   Select: : None  OUTSIDE HISTORIAN(S):  none    Alyse Fernandez is a 38 y.o. female who presents due to bilateral lower pelvic pain starting 2 days ago.  Patient notes she often gets pain at the end of her menstrual cycle.  Her menstrual cycle ended 2 days ago.  Menstrual cycle was late but otherwise was normal in character.  She notes the pain after her menstrual cycle is more severe than typical.  She takes Tylenol usually for the pain but did not take any tonight.  She also notes this pain can be worsened after intercourse and notes that this will happen this evening after intercourse.  She notes no change in vaginal discharge, no bleeding.  She is not on OCPs or hormones.  She is status post tubal ligation and prior  sections.  She reports that prior diagnosis related to this pain from her OB/GYN but cannot recall the name.  She otherwise notes no vomiting, no diarrhea, no dysuria or hematuria, no fevers.    PAST MEDICAL HISTORY   has a past medical history of Depression ().    SURGICAL HISTORY   has a past surgical history that includes appendectomy; abdominal exploration (); dilation and curettage; tube & ectopic preg., removal (); primary c section (,); and repeat c section w tubal ligation (N/A, 3/31/2018).    FAMILY HISTORY  Family History   Problem Relation Age of Onset    Cancer Mother        SOCIAL HISTORY  Social History     Tobacco Use    Smoking status: Every Day      "Current packs/day: 0.25     Types: Cigarettes    Smokeless tobacco: Never    Tobacco comments:     Pt states smoking about 3 day   Vaping Use    Vaping Use: Never used   Substance and Sexual Activity    Alcohol use: Yes     Comment: occ    Drug use: Yes     Types: Methamphetamines, Inhaled     Comment: THC    Sexual activity: Yes     Partners: Male     Birth control/protection: Condom       CURRENT MEDICATIONS  Home Medications       Reviewed by Jose Raul Gonzalez R.N. (Registered Nurse) on 03/28/24 at 0133  Med List Status: Partial     Medication Last Dose Status   cyclobenzaprine (FLEXERIL) 10 mg Tab  Active   lidocaine (LIDODERM) 5 % Patch  Active   meloxicam (MOBIC) 7.5 MG Tab  Active   ondansetron (ZOFRAN ODT) 4 MG TABLET DISPERSIBLE  Active   ondansetron (ZOFRAN ODT) 4 MG TABLET DISPERSIBLE  Active                    ALLERGIES  No Known Allergies    PHYSICAL EXAM  VITAL SIGNS: /78   Pulse 83   Temp 36.8 °C (98.2 °F) (Temporal)   Resp 18   Ht 1.549 m (5' 1\")   Wt 68.9 kg (151 lb 14.4 oz)   SpO2 90%   BMI 28.70 kg/m²    Constitutional: Awake and alert.  Mild distress due to pain  Head: NCAT.  HEENT: Normal Conjunctiva. PERRLA.  Neck: Grossly normal range of motion. Airway midline.  Cardiovascular: Normal heart rate, Normal rhythm.  Thorax & Lungs: No respiratory distress. Clear to Auscultation bilaterally.  Abdomen: Normal inspection. Nontender. Nondistended.  No rebound or guarding  Skin: No obvious rash.  Back: No tenderness, No CVA tenderness.   Musculoskeletal: No obvious deformity. Moves all extremities Well.  Neurologic: A&Ox3.   Psychiatric: Mood and affect are appropriate for situation.    LABS  Results for orders placed or performed during the hospital encounter of 03/28/24   CBC WITH DIFFERENTIAL   Result Value Ref Range    WBC 9.0 4.8 - 10.8 K/uL    RBC 4.53 4.20 - 5.40 M/uL    Hemoglobin 15.1 12.0 - 16.0 g/dL    Hematocrit 43.2 37.0 - 47.0 %    MCV 95.4 81.4 - 97.8 fL    MCH 33.3 (H) 27.0 " - 33.0 pg    MCHC 35.0 32.2 - 35.5 g/dL    RDW 48.7 35.9 - 50.0 fL    Platelet Count 263 164 - 446 K/uL    MPV 10.5 9.0 - 12.9 fL    Neutrophils-Polys 63.80 44.00 - 72.00 %    Lymphocytes 29.80 22.00 - 41.00 %    Monocytes 5.40 0.00 - 13.40 %    Eosinophils 0.20 0.00 - 6.90 %    Basophils 0.60 0.00 - 1.80 %    Immature Granulocytes 0.20 0.00 - 0.90 %    Nucleated RBC 0.00 0.00 - 0.20 /100 WBC    Neutrophils (Absolute) 5.76 1.82 - 7.42 K/uL    Lymphs (Absolute) 2.69 1.00 - 4.80 K/uL    Monos (Absolute) 0.49 0.00 - 0.85 K/uL    Eos (Absolute) 0.02 0.00 - 0.51 K/uL    Baso (Absolute) 0.05 0.00 - 0.12 K/uL    Immature Granulocytes (abs) 0.02 0.00 - 0.11 K/uL    NRBC (Absolute) 0.00 K/uL   COMP METABOLIC PANEL   Result Value Ref Range    Sodium 142 135 - 145 mmol/L    Potassium 3.9 3.6 - 5.5 mmol/L    Chloride 104 96 - 112 mmol/L    Co2 22 20 - 33 mmol/L    Anion Gap 16.0 7.0 - 16.0    Glucose 89 65 - 99 mg/dL    Bun 6 (L) 8 - 22 mg/dL    Creatinine 0.48 (L) 0.50 - 1.40 mg/dL    Calcium 8.7 8.5 - 10.5 mg/dL    Correct Calcium 8.0 (L) 8.5 - 10.5 mg/dL    AST(SGOT) 49 (H) 12 - 45 U/L    ALT(SGPT) 20 2 - 50 U/L    Alkaline Phosphatase 89 30 - 99 U/L    Total Bilirubin 0.4 0.1 - 1.5 mg/dL    Albumin 4.9 3.2 - 4.9 g/dL    Total Protein 7.6 6.0 - 8.2 g/dL    Globulin 2.7 1.9 - 3.5 g/dL    A-G Ratio 1.8 g/dL   LIPASE   Result Value Ref Range    Lipase 35 11 - 82 U/L   HCG QUAL SERUM   Result Value Ref Range    Beta-Hcg Qualitative Serum Negative Negative   URINALYSIS,CULTURE IF INDICATED    Specimen: Blood   Result Value Ref Range    Color Yellow     Character Clear     Specific Gravity 1.007 <1.035    Ph 5.5 5.0 - 8.0    Glucose Negative Negative mg/dL    Ketones Negative Negative mg/dL    Protein Negative Negative mg/dL    Bilirubin Negative Negative    Urobilinogen, Urine 0.2 Negative    Nitrite Negative Negative    Leukocyte Esterase Negative Negative    Occult Blood Negative Negative    Micro Urine Req see below     ESTIMATED GFR   Result Value Ref Range    GFR (CKD-EPI) 124 >60 mL/min/1.73 m 2         RADIOLOGY  I have independently interpreted the diagnostic imaging associated with this visit and am waiting the final reading from the radiologist.   My preliminary interpretation is as follows: no ovarian cyst  Radiologist interpretation:   US-PELVIC TRANSABDOMINAL ONLY   Final Result         1.  Small free fluid collection posterior cul-de-sac, within physiologic limits for reproductive age female, otherwise normal transabdominal appearance of the pelvis.            COURSE & MEDICAL DECISION MAKING    ED Observation Status? No; Patient does not meet criteria for ED Observation.     INITIAL ASSESSMENT, COURSE AND PLAN  Care Narrative:   38-year-old female history of tubal ligation here for pelvic pain for the past 2 days worsening tonight after intercourse.  Afebrile and reassuring vital signs on exam mild distress due to pain, soft nontender abdomen, no rebound or guarding.  Patient notes no vaginal complaints and declines a pelvic exam.  Differential includes ovarian cyst, ovarian torsion, PID, STI, urinary tract infection, pyonephritis, appendicitis.  Will treat with oral analgesics while we obtain labs and an ultrasound of the pelvis.  Labs are reassuring with no leukocytosis or left shift, normal urine, abdominal labs are unremarkable.  Ultrasound with likely physiologic free fluid.  Again her abdomen is soft and nontender with no peritoneal signs.  Her pain is well-controlled after interventions.  She was discharged and advised to follow-up with her OB/GYN given recurrent/cyclic pain complaints similar to this.  Agree with continuing Tylenol as needed for pain.      ADDITIONAL PROBLEM LIST  None  DISPOSITION AND DISCUSSIONS  I have discussed management of the patient with the following physicians and PAWEL's:  None    Discussion of management with other QHP or appropriate source(s): None     Escalation of care considered,  and ultimately not performed:blood analysis, diagnostic imaging, and acute inpatient care management, however at this time, the patient is most appropriate for outpatient management    Barriers to care at this time, including but not limited to: Patient does not have established PCP and Patient lacks financial resources.     Decision tools and prescription drugs considered including, but not limited to: Pain Medications tylenol for pain .    FINAL DIAGNOSIS  1. Pelvic pain Acute   2. Elevated blood pressure reading Acute          Electronically signed by: Huong Duval D.O., 3/28/2024 2:18 AM

## 2024-03-28 NOTE — DISCHARGE INSTRUCTIONS
Please establish/follow-up with your OB/GYN for this cyclical pain that you get after your cycles.  Please take Tylenol as needed for the pain.  Your workup including ultrasound today are reassuring with no acute findings.

## 2024-03-28 NOTE — ED NOTES
Pt discharged . GCS 15. pt in possession of belongings. Pt provided discharge education and information pertaining to medications and follow up appointments. Pt received copy of discharge instructions and verbalized understanding.     Vitals:    03/28/24 0342   BP: 127/78   Pulse: 83   Resp: 18   Temp: 36.8 °C (98.2 °F)   SpO2: 90%

## 2024-03-28 NOTE — ED NOTES
"Patient offered wheel chair, refused and stated \" hell no , I am not handy cap\" and preferred to walk. Pt ambulated [facility] to [facility] from lobby, stopped by charge desk, refused to walk any further because of pain patient offered wheel chair, refused and sit on the chair at [facility] and this nurse educate the patient, she has to be in room to meet ERP and wheel chair is option for tranfers, patient then agreed, this nurse wheeled patient to room. On monitor, in gown, call light in reach. Chart up for ERP.    "

## (undated) DEVICE — SUTUREABS02-0 CT1 27IN - (36EA/BX)

## (undated) DEVICE — PACK C-SECTION (2EA/CA)

## (undated) DEVICE — STAPLER SKIN DISP - (6/BX 10BX/CA) VISISTAT

## (undated) DEVICE — WATER IRRIG. STER. 1500 ML - (9/CA)

## (undated) DEVICE — SODIUM CHL IRRIGATION 0.9% 1000ML (12EA/CA)

## (undated) DEVICE — TRAY SPINAL ANESTHESIA NON-SAFETY (10/CA)

## (undated) DEVICE — SUTURE 0 VICRYL PLUS CT-1 - 36 INCH (36/BX)

## (undated) DEVICE — KIT  I.V. START (100EA/CA)

## (undated) DEVICE — DETERGENT RENUZYME PLUS 10 OZ PACKET (50/BX)

## (undated) DEVICE — GLOVE BIOGEL SZ 6 PF LATEX - (50EA/BX 4BX/CA)

## (undated) DEVICE — SUTURE 1 CHROMIC GUTCT-1 27 (36PK/BX)"

## (undated) DEVICE — TUBING CLEARLINK DUO-VENT - C-FLO (48EA/CA)

## (undated) DEVICE — ELECTRODE DUAL RETURN W/ CORD - (50/PK)

## (undated) DEVICE — SUTURE 0 GUT-PLAIN (36PK/BX)

## (undated) DEVICE — SET EXTENSION WITH 2 PORTS (48EA/CA) ***PART #2C8610 IS A SUBSTITUTE*****

## (undated) DEVICE — HEAD HOLDER JUNIOR/ADULT

## (undated) DEVICE — CATHETER IV NON-SAFETY 18 GA X 1 1/4 (50/BX 4BX/CA)